# Patient Record
Sex: FEMALE | Race: WHITE | NOT HISPANIC OR LATINO
[De-identification: names, ages, dates, MRNs, and addresses within clinical notes are randomized per-mention and may not be internally consistent; named-entity substitution may affect disease eponyms.]

---

## 2021-07-21 ENCOUNTER — APPOINTMENT (OUTPATIENT)
Dept: OPHTHALMOLOGY | Facility: CLINIC | Age: 86
End: 2021-07-21

## 2021-07-28 ENCOUNTER — APPOINTMENT (OUTPATIENT)
Dept: OPHTHALMOLOGY | Facility: CLINIC | Age: 86
End: 2021-07-28

## 2021-08-04 ENCOUNTER — NON-APPOINTMENT (OUTPATIENT)
Age: 86
End: 2021-08-04

## 2021-08-04 ENCOUNTER — APPOINTMENT (OUTPATIENT)
Dept: OPHTHALMOLOGY | Facility: CLINIC | Age: 86
End: 2021-08-04
Payer: MEDICARE

## 2021-08-04 PROCEDURE — 99213 OFFICE O/P EST LOW 20 MIN: CPT

## 2021-11-10 ENCOUNTER — APPOINTMENT (OUTPATIENT)
Dept: OPHTHALMOLOGY | Facility: CLINIC | Age: 86
End: 2021-11-10
Payer: MEDICARE

## 2021-11-10 ENCOUNTER — NON-APPOINTMENT (OUTPATIENT)
Age: 86
End: 2021-11-10

## 2021-11-10 PROCEDURE — 92133 CPTRZD OPH DX IMG PST SGM ON: CPT

## 2021-11-10 PROCEDURE — 92014 COMPRE OPH EXAM EST PT 1/>: CPT

## 2022-01-19 ENCOUNTER — APPOINTMENT (OUTPATIENT)
Dept: OPHTHALMOLOGY | Facility: CLINIC | Age: 87
End: 2022-01-19

## 2022-01-26 ENCOUNTER — APPOINTMENT (OUTPATIENT)
Dept: OPHTHALMOLOGY | Facility: CLINIC | Age: 87
End: 2022-01-26
Payer: MEDICARE

## 2022-01-26 ENCOUNTER — NON-APPOINTMENT (OUTPATIENT)
Age: 87
End: 2022-01-26

## 2022-01-26 PROCEDURE — 92083 EXTENDED VISUAL FIELD XM: CPT

## 2022-02-16 ENCOUNTER — APPOINTMENT (OUTPATIENT)
Dept: OPHTHALMOLOGY | Facility: CLINIC | Age: 87
End: 2022-02-16
Payer: MEDICARE

## 2022-02-16 ENCOUNTER — NON-APPOINTMENT (OUTPATIENT)
Age: 87
End: 2022-02-16

## 2022-02-16 PROCEDURE — 92134 CPTRZ OPH DX IMG PST SGM RTA: CPT

## 2022-02-16 PROCEDURE — 92014 COMPRE OPH EXAM EST PT 1/>: CPT

## 2022-08-24 ENCOUNTER — APPOINTMENT (OUTPATIENT)
Dept: OPHTHALMOLOGY | Facility: CLINIC | Age: 87
End: 2022-08-24

## 2022-08-24 ENCOUNTER — NON-APPOINTMENT (OUTPATIENT)
Age: 87
End: 2022-08-24

## 2022-08-24 PROCEDURE — 92014 COMPRE OPH EXAM EST PT 1/>: CPT

## 2022-08-24 PROCEDURE — 92250 FUNDUS PHOTOGRAPHY W/I&R: CPT

## 2022-12-14 ENCOUNTER — NON-APPOINTMENT (OUTPATIENT)
Age: 87
End: 2022-12-14

## 2022-12-14 ENCOUNTER — APPOINTMENT (OUTPATIENT)
Dept: OPHTHALMOLOGY | Facility: CLINIC | Age: 87
End: 2022-12-14

## 2022-12-14 PROCEDURE — 92133 CPTRZD OPH DX IMG PST SGM ON: CPT

## 2022-12-14 PROCEDURE — 92083 EXTENDED VISUAL FIELD XM: CPT

## 2022-12-21 ENCOUNTER — NON-APPOINTMENT (OUTPATIENT)
Age: 87
End: 2022-12-21

## 2022-12-21 ENCOUNTER — APPOINTMENT (OUTPATIENT)
Dept: OPHTHALMOLOGY | Facility: CLINIC | Age: 87
End: 2022-12-21

## 2022-12-21 PROCEDURE — 65205 REMOVE FOREIGN BODY FROM EYE: CPT | Mod: LT

## 2022-12-21 PROCEDURE — 99213 OFFICE O/P EST LOW 20 MIN: CPT | Mod: 25

## 2023-04-19 ENCOUNTER — NON-APPOINTMENT (OUTPATIENT)
Age: 88
End: 2023-04-19

## 2023-04-19 ENCOUNTER — APPOINTMENT (OUTPATIENT)
Dept: OPHTHALMOLOGY | Facility: CLINIC | Age: 88
End: 2023-04-19
Payer: MEDICARE

## 2023-04-19 PROCEDURE — 65210 REMOVE FOREIGN BODY FROM EYE: CPT | Mod: LT

## 2023-04-19 PROCEDURE — 99213 OFFICE O/P EST LOW 20 MIN: CPT | Mod: 25

## 2023-07-19 ENCOUNTER — APPOINTMENT (OUTPATIENT)
Dept: OPHTHALMOLOGY | Facility: CLINIC | Age: 88
End: 2023-07-19

## 2023-07-26 ENCOUNTER — INPATIENT (INPATIENT)
Facility: HOSPITAL | Age: 88
LOS: 6 days | Discharge: NURSING, REHAB OR LONG-TERM CARE | DRG: 812 | End: 2023-08-02
Attending: HOSPITALIST | Admitting: HOSPITALIST
Payer: MEDICARE

## 2023-07-26 VITALS
HEIGHT: 64 IN | SYSTOLIC BLOOD PRESSURE: 101 MMHG | TEMPERATURE: 98 F | DIASTOLIC BLOOD PRESSURE: 44 MMHG | HEART RATE: 63 BPM | RESPIRATION RATE: 24 BRPM | OXYGEN SATURATION: 100 % | WEIGHT: 136.91 LBS

## 2023-07-26 DIAGNOSIS — E87.1 HYPO-OSMOLALITY AND HYPONATREMIA: ICD-10-CM

## 2023-07-26 LAB
ABO RH CONFIRMATION: SIGNIFICANT CHANGE UP
ADD ON TEST-SPECIMEN IN LAB: SIGNIFICANT CHANGE UP
ALBUMIN SERPL ELPH-MCNC: 2.4 G/DL — LOW (ref 3.3–5)
ALP SERPL-CCNC: 92 U/L — SIGNIFICANT CHANGE UP (ref 40–120)
ALT FLD-CCNC: 10 U/L — LOW (ref 12–78)
ANION GAP SERPL CALC-SCNC: 6 MMOL/L — SIGNIFICANT CHANGE UP (ref 5–17)
ANION GAP SERPL CALC-SCNC: 7 MMOL/L — SIGNIFICANT CHANGE UP (ref 5–17)
APPEARANCE UR: CLEAR — SIGNIFICANT CHANGE UP
APTT BLD: 30.4 SEC — SIGNIFICANT CHANGE UP (ref 24.5–35.6)
AST SERPL-CCNC: 13 U/L — LOW (ref 15–37)
BACTERIA # UR AUTO: ABNORMAL
BASOPHILS # BLD AUTO: 0.09 K/UL — SIGNIFICANT CHANGE UP (ref 0–0.2)
BASOPHILS NFR BLD AUTO: 0.7 % — SIGNIFICANT CHANGE UP (ref 0–2)
BILIRUB SERPL-MCNC: 0.3 MG/DL — SIGNIFICANT CHANGE UP (ref 0.2–1.2)
BILIRUB UR-MCNC: NEGATIVE — SIGNIFICANT CHANGE UP
BLD GP AB SCN SERPL QL: SIGNIFICANT CHANGE UP
BUN SERPL-MCNC: 20 MG/DL — SIGNIFICANT CHANGE UP (ref 7–23)
BUN SERPL-MCNC: 23 MG/DL — SIGNIFICANT CHANGE UP (ref 7–23)
CALCIUM SERPL-MCNC: 7.7 MG/DL — LOW (ref 8.5–10.1)
CALCIUM SERPL-MCNC: 8.2 MG/DL — LOW (ref 8.5–10.1)
CHLORIDE SERPL-SCNC: 88 MMOL/L — LOW (ref 96–108)
CHLORIDE SERPL-SCNC: 90 MMOL/L — LOW (ref 96–108)
CO2 SERPL-SCNC: 24 MMOL/L — SIGNIFICANT CHANGE UP (ref 22–31)
CO2 SERPL-SCNC: 25 MMOL/L — SIGNIFICANT CHANGE UP (ref 22–31)
COLOR SPEC: YELLOW — SIGNIFICANT CHANGE UP
COMMENT - URINE: SIGNIFICANT CHANGE UP
CREAT SERPL-MCNC: 1.05 MG/DL — SIGNIFICANT CHANGE UP (ref 0.5–1.3)
CREAT SERPL-MCNC: 1.15 MG/DL — SIGNIFICANT CHANGE UP (ref 0.5–1.3)
DIFF PNL FLD: NEGATIVE — SIGNIFICANT CHANGE UP
EGFR: 46 ML/MIN/1.73M2 — LOW
EGFR: 51 ML/MIN/1.73M2 — LOW
EOSINOPHIL # BLD AUTO: 0.12 K/UL — SIGNIFICANT CHANGE UP (ref 0–0.5)
EOSINOPHIL NFR BLD AUTO: 0.9 % — SIGNIFICANT CHANGE UP (ref 0–6)
EPI CELLS # UR: ABNORMAL
GLUCOSE SERPL-MCNC: 100 MG/DL — HIGH (ref 70–99)
GLUCOSE SERPL-MCNC: 96 MG/DL — SIGNIFICANT CHANGE UP (ref 70–99)
GLUCOSE UR QL: NEGATIVE — SIGNIFICANT CHANGE UP
HCT VFR BLD CALC: 22.5 % — LOW (ref 34.5–45)
HGB BLD-MCNC: 8 G/DL — LOW (ref 11.5–15.5)
IMM GRANULOCYTES NFR BLD AUTO: 0.8 % — SIGNIFICANT CHANGE UP (ref 0–0.9)
INR BLD: 1 RATIO — SIGNIFICANT CHANGE UP (ref 0.85–1.18)
KETONES UR-MCNC: NEGATIVE — SIGNIFICANT CHANGE UP
LEUKOCYTE ESTERASE UR-ACNC: ABNORMAL
LYMPHOCYTES # BLD AUTO: 15.6 % — SIGNIFICANT CHANGE UP (ref 13–44)
LYMPHOCYTES # BLD AUTO: 2.15 K/UL — SIGNIFICANT CHANGE UP (ref 1–3.3)
MAGNESIUM SERPL-MCNC: 2.3 MG/DL — SIGNIFICANT CHANGE UP (ref 1.6–2.6)
MAGNESIUM SERPL-MCNC: 2.3 MG/DL — SIGNIFICANT CHANGE UP (ref 1.6–2.6)
MCHC RBC-ENTMCNC: 31.5 PG — SIGNIFICANT CHANGE UP (ref 27–34)
MCHC RBC-ENTMCNC: 35.6 GM/DL — SIGNIFICANT CHANGE UP (ref 32–36)
MCV RBC AUTO: 88.6 FL — SIGNIFICANT CHANGE UP (ref 80–100)
MONOCYTES # BLD AUTO: 1.06 K/UL — HIGH (ref 0–0.9)
MONOCYTES NFR BLD AUTO: 7.7 % — SIGNIFICANT CHANGE UP (ref 2–14)
NEUTROPHILS # BLD AUTO: 10.26 K/UL — HIGH (ref 1.8–7.4)
NEUTROPHILS NFR BLD AUTO: 74.3 % — SIGNIFICANT CHANGE UP (ref 43–77)
NITRITE UR-MCNC: NEGATIVE — SIGNIFICANT CHANGE UP
NT-PROBNP SERPL-SCNC: 8936 PG/ML — HIGH (ref 0–450)
PH UR: 6 — SIGNIFICANT CHANGE UP (ref 5–8)
PHOSPHATE SERPL-MCNC: 2.7 MG/DL — SIGNIFICANT CHANGE UP (ref 2.5–4.5)
PHOSPHATE SERPL-MCNC: 3.1 MG/DL — SIGNIFICANT CHANGE UP (ref 2.5–4.5)
PLATELET # BLD AUTO: 381 K/UL — SIGNIFICANT CHANGE UP (ref 150–400)
POTASSIUM SERPL-MCNC: 4.2 MMOL/L — SIGNIFICANT CHANGE UP (ref 3.5–5.3)
POTASSIUM SERPL-MCNC: 4.3 MMOL/L — SIGNIFICANT CHANGE UP (ref 3.5–5.3)
POTASSIUM SERPL-SCNC: 4.2 MMOL/L — SIGNIFICANT CHANGE UP (ref 3.5–5.3)
POTASSIUM SERPL-SCNC: 4.3 MMOL/L — SIGNIFICANT CHANGE UP (ref 3.5–5.3)
PROT SERPL-MCNC: 6.2 GM/DL — SIGNIFICANT CHANGE UP (ref 6–8.3)
PROT UR-MCNC: SIGNIFICANT CHANGE UP MG/DL
PROTHROM AB SERPL-ACNC: 11.3 SEC — SIGNIFICANT CHANGE UP (ref 9.5–13)
RBC # BLD: 2.54 M/UL — LOW (ref 3.8–5.2)
RBC # FLD: 12.6 % — SIGNIFICANT CHANGE UP (ref 10.3–14.5)
RBC CASTS # UR COMP ASSIST: NEGATIVE /HPF — SIGNIFICANT CHANGE UP (ref 0–4)
SODIUM SERPL-SCNC: 119 MMOL/L — CRITICAL LOW (ref 135–145)
SODIUM SERPL-SCNC: 121 MMOL/L — LOW (ref 135–145)
SP GR SPEC: 1.01 — SIGNIFICANT CHANGE UP (ref 1.01–1.02)
TROPONIN I, HIGH SENSITIVITY RESULT: 12.01 NG/L — SIGNIFICANT CHANGE UP
TSH SERPL-MCNC: 4.22 UU/ML — SIGNIFICANT CHANGE UP (ref 0.34–4.82)
UROBILINOGEN FLD QL: NEGATIVE — SIGNIFICANT CHANGE UP
WBC # BLD: 13.79 K/UL — HIGH (ref 3.8–10.5)
WBC # FLD AUTO: 13.79 K/UL — HIGH (ref 3.8–10.5)
WBC UR QL: SIGNIFICANT CHANGE UP /HPF (ref 0–5)

## 2023-07-26 PROCEDURE — 83930 ASSAY OF BLOOD OSMOLALITY: CPT

## 2023-07-26 PROCEDURE — 71045 X-RAY EXAM CHEST 1 VIEW: CPT | Mod: 26

## 2023-07-26 PROCEDURE — G1004: CPT

## 2023-07-26 PROCEDURE — 71046 X-RAY EXAM CHEST 2 VIEWS: CPT

## 2023-07-26 PROCEDURE — 83880 ASSAY OF NATRIURETIC PEPTIDE: CPT

## 2023-07-26 PROCEDURE — 84550 ASSAY OF BLOOD/URIC ACID: CPT

## 2023-07-26 PROCEDURE — 84300 ASSAY OF URINE SODIUM: CPT

## 2023-07-26 PROCEDURE — 85027 COMPLETE CBC AUTOMATED: CPT

## 2023-07-26 PROCEDURE — 83935 ASSAY OF URINE OSMOLALITY: CPT

## 2023-07-26 PROCEDURE — 83550 IRON BINDING TEST: CPT

## 2023-07-26 PROCEDURE — 81001 URINALYSIS AUTO W/SCOPE: CPT

## 2023-07-26 PROCEDURE — P9016: CPT

## 2023-07-26 PROCEDURE — 70450 CT HEAD/BRAIN W/O DYE: CPT | Mod: MG

## 2023-07-26 PROCEDURE — 99291 CRITICAL CARE FIRST HOUR: CPT

## 2023-07-26 PROCEDURE — 82272 OCCULT BLD FECES 1-3 TESTS: CPT

## 2023-07-26 PROCEDURE — 82728 ASSAY OF FERRITIN: CPT

## 2023-07-26 PROCEDURE — 80048 BASIC METABOLIC PNL TOTAL CA: CPT

## 2023-07-26 PROCEDURE — 93010 ELECTROCARDIOGRAM REPORT: CPT

## 2023-07-26 PROCEDURE — 83540 ASSAY OF IRON: CPT

## 2023-07-26 PROCEDURE — 80162 ASSAY OF DIGOXIN TOTAL: CPT

## 2023-07-26 PROCEDURE — 97163 PT EVAL HIGH COMPLEX 45 MIN: CPT | Mod: GP

## 2023-07-26 PROCEDURE — 97116 GAIT TRAINING THERAPY: CPT | Mod: GP

## 2023-07-26 PROCEDURE — 86920 COMPATIBILITY TEST SPIN: CPT

## 2023-07-26 PROCEDURE — 36430 TRANSFUSION BLD/BLD COMPNT: CPT

## 2023-07-26 PROCEDURE — 36415 COLL VENOUS BLD VENIPUNCTURE: CPT

## 2023-07-26 RX ORDER — ESCITALOPRAM OXALATE 10 MG/1
2 TABLET, FILM COATED ORAL
Refills: 0 | DISCHARGE

## 2023-07-26 RX ORDER — ACETAMINOPHEN 500 MG
2 TABLET ORAL
Refills: 0 | DISCHARGE

## 2023-07-26 RX ORDER — RANOLAZINE 500 MG/1
1 TABLET, FILM COATED, EXTENDED RELEASE ORAL
Refills: 0 | DISCHARGE

## 2023-07-26 RX ORDER — SODIUM ZIRCONIUM CYCLOSILICATE 10 G/10G
10 POWDER, FOR SUSPENSION ORAL
Refills: 0 | DISCHARGE

## 2023-07-26 RX ORDER — SODIUM CHLORIDE 9 MG/ML
1 INJECTION INTRAMUSCULAR; INTRAVENOUS; SUBCUTANEOUS
Refills: 0 | DISCHARGE
Start: 2023-07-26 | End: 2023-07-29

## 2023-07-26 RX ORDER — ASPIRIN/CALCIUM CARB/MAGNESIUM 324 MG
1 TABLET ORAL
Refills: 0 | DISCHARGE

## 2023-07-26 RX ORDER — ATORVASTATIN CALCIUM 80 MG/1
1 TABLET, FILM COATED ORAL
Refills: 0 | DISCHARGE

## 2023-07-26 RX ORDER — LEVOTHYROXINE SODIUM 125 MCG
1 TABLET ORAL
Refills: 0 | DISCHARGE

## 2023-07-26 RX ORDER — DOCUSATE SODIUM 100 MG
2 CAPSULE ORAL
Refills: 0 | DISCHARGE

## 2023-07-26 RX ORDER — POLYETHYLENE GLYCOL 3350 17 G/17G
17 POWDER, FOR SOLUTION ORAL
Refills: 0 | DISCHARGE

## 2023-07-26 RX ORDER — METOPROLOL TARTRATE 50 MG
1 TABLET ORAL
Refills: 0 | DISCHARGE

## 2023-07-26 RX ORDER — CLOPIDOGREL BISULFATE 75 MG/1
1 TABLET, FILM COATED ORAL
Refills: 0 | DISCHARGE

## 2023-07-26 RX ORDER — LATANOPROST 0.05 MG/ML
1 SOLUTION/ DROPS OPHTHALMIC; TOPICAL
Refills: 0 | DISCHARGE

## 2023-07-26 RX ORDER — MAGNESIUM HYDROXIDE 400 MG/1
30 TABLET, CHEWABLE ORAL
Refills: 0 | DISCHARGE

## 2023-07-26 NOTE — ED PROVIDER NOTE - DIFFERENTIAL DIAGNOSIS
Including but not limited to: anemia, electrolyte disturbance, dehydration, UTI, other Differential Diagnosis

## 2023-07-26 NOTE — CONSULT NOTE ADULT - SUBJECTIVE AND OBJECTIVE BOX
Patient is a 89y old  Female who presents with a chief complaint of     BRIEF HOSPITAL COURSE-  88 y/o Female with PMH of Afib (s/p PPM), TAVR (On ASA and Plavix), HTN, HLD, GERD, Anxiety, Former Smoker presents to  ED BIBA for abnormal lab value. Reports she was sent in from Rehab for low hemoglobin ( 7.3 to 6.7). States she has been increasingly tired this past week. Admits to associated dizziness and intermittent headaches.      Collateral from Daughter. States she had a mechanical fall with LOC at home in beginning of July. Sent to ED for evaluation, found to have grade 2 ruptured spleen and cardiac arrhthymias with sinus pauses prompting POM placement. Discharged to rehab for extensive PT.         Review of Systems:  CONSTITUTIONAL: +Fatigue. No fever or chills.  EYES: No eye pain, visual disturbances, or discharge.  ENMT:  No difficulty hearing, tinnitus, vertigo. No sinus or throat pain.  NECK: No pain or stiffness.  RESPIRATORY: No cough, wheezing, chills or hemoptysis. No shortness of breath.  CARDIOVASCULAR: No chest pain, palpitations, dizziness, or leg swelling.  GASTROINTESTINAL: No abdominal or epigastric pain. No nausea, vomiting, or hematemesis. No diarrhea or constipation. No melena or hematochezia.  GENITOURINARY: No dysuria, frequency, hematuria, or incontinence.  NEUROLOGICAL: No headaches, memory loss, loss of strength, numbness, or tremors.  SKIN: No itching, burning, rashes, or lesions.   MUSCULOSKELETAL: No joint pain or swelling. No muscle, back, or extremity pain.  PSYCHIATRIC: No depression, anxiety, mood swings, or difficulty sleeping.        PAST MEDICAL & SURGICAL HISTORY-        Medications:          ICU Vital Signs Last 24 Hrs  T(C): 36.7 (2023 21:14), Max: 36.7 (2023 21:14)  T(F): 98.1 (2023 21:14), Max: 98.1 (2023 21:14)  HR: 84 (2023 21:14) (63 - 84)  BP: 118/61 (2023 21:14) (101/44 - 118/61)  BP(mean): --  ABP: --  ABP(mean): --  RR: 18 (2023 21:14) (18 - 24)  SpO2: 97% (2023 21:14) (97% - 100%)    O2 Parameters below as of 2023 21:14  Patient On (Oxygen Delivery Method): room air        I&O's Detail            LABS:                        8.0    13.79 )-----------( 381      ( 2023 16:53 )             22.5     07-    121<L>  |  90<L>  |  20  ----------------------------<  96  4.2   |  24  |  1.05    Ca    7.7<L>      2023 20:36  Phos  2.7       Mg     2.3         TPro  6.2  /  Alb  2.4<L>  /  TBili  0.3  /  DBili  x   /  AST  13<L>  /  ALT  10<L>  /  AlkPhos  92            CAPILLARY BLOOD GLUCOSE        PT/INR - ( 2023 17:23 )   PT: 11.3 sec;   INR: 1.00 ratio    PTT - ( 2023 17:23 )  PTT:30.4 sec        Urinalysis Basic - ( 2023 20:36 )  Color: Yellow / Appearance: Clear / S.015 / pH: x  Gluc: 96 mg/dL / Ketone: Negative  / Bili: Negative / Urobili: Negative   Blood: x / Protein: Trace mg/dL / Nitrite: Negative   Leuk Esterase: Trace / RBC: Negative /HPF / WBC 0-2 /HPF   Sq Epi: x / Non Sq Epi: x / Bacteria: Occasional        CULTURES:        Physical Examination:  General: Elderly female, well developed. In no acute distress.    HEENT: Pupils equal, reactive to light. Symmetric.  PULM: Clear to auscultation bilaterally, no adventitious sounds. No significant sputum production.  NECK: Supple, no lymphadenopathy, trachea midline.  CVS: Regular rate, paced rhythm. No murmurs, rubs, or gallops. S1, S2 intact.   ABD: Soft, nondistended, nontender, normoactive bowel sounds. No masses palpable.   EXT: No edema, nontender.  SKIN: Warm and well perfused, no rashes noted.  NEURO: Alert, oriented, interactive, nonfocal.  DEVICES:         RADIOLOGY-    < from: Xray Chest 1 View- PORTABLE-Routine (Xray Chest 1 View- PORTABLE-Routine .) (23 @ 18:00) >  ACC: [id number] EXAM:  XR CHEST PORTABLE ROUTINE 1V   ORDERED BY: MATIAS SHARPE     PROCEDURE DATE:  2023      INTERPRETATION:  Portable chest radiograph    CLINICAL INFORMATION: Generalized weakness    TECHNIQUE:  Portable  AP chest radiograph.    COMPARISON: 2023 Chest X Ray .    FINDINGS:  CATHETERS AND TUBES: None    PULMONARY: The visualized lungs are clear of airspace consolidations or   pleural effusions.   No pneumothorax.    HEART/VASCULAR: The heart is mildly enlarged intransverse diameter.   Status post cardiac valve replacement.  Cardiac device wire lead is within right ventricle. .    BONES: Visualized osseous thorax intact.    IMPRESSION:   No radiographic evidence of active chest disease.    --- End of Report ---      LEDA GUPTA MD; Attending Radiologist  This document has been electronically signed. 2023  6:24PM    < end of copied text >        Time spent on this patient encounter, which includes documenting this note in the electronic medical record, was 75+ minutes including assessing the presenting problems with associated risks, reviewing the medical record to prepare for the encounter, and meeting face to face with the patient to obtain additional history.  I have also performed an appropriate physical exam, made interventions listed and ordered and interpreted appropriate diagnostic studies as documented.     To improve communication and patient safety, I have coordinated care with the multidisciplinary team including the bedside nurse, appropriate attending of record and consultants as needed.       Patient is a 89y old  Female who presents with a chief complaint of     BRIEF HOSPITAL COURSE-  89 y/o Female with PMH of Afib (s/p PPM), TAVR (On ASA and Plavix), HTN, HLD, GERD, Anxiety, Former Smoker presents to  ED BIBA for abnormal lab value. Reports she was sent in from Rehab for low hemoglobin ( 7.3 to 6.7). States she has been increasingly tired this past week. Admits to associated dizziness and intermittent headaches.      Collateral from Daughter. States she had a mechanical fall with LOC at home in beginning of July. Sent to ED for evaluation, found to have grade 2 ruptured spleen and cardiac arrhthymias with sinus pauses prompting POM placement. Discharged to rehab for extensive PT.         Review of Systems:  CONSTITUTIONAL: +Fatigue. No fever or chills.  EYES: No eye pain, visual disturbances, or discharge.  ENMT:  No difficulty hearing, tinnitus, vertigo. No sinus or throat pain.  NECK: No pain or stiffness.  RESPIRATORY: No cough, wheezing, chills or hemoptysis. No shortness of breath.  CARDIOVASCULAR: No chest pain, palpitations, dizziness, or leg swelling.  GASTROINTESTINAL: No abdominal or epigastric pain. No nausea, vomiting, or hematemesis. No diarrhea or constipation. No melena or hematochezia.  GENITOURINARY: No dysuria, frequency, hematuria, or incontinence.  NEUROLOGICAL: No headaches, memory loss, loss of strength, numbness, or tremors.  SKIN: No itching, burning, rashes, or lesions.   MUSCULOSKELETAL: No joint pain or swelling. No muscle, back, or extremity pain.  PSYCHIATRIC: No depression, anxiety, mood swings, or difficulty sleeping.        PAST MEDICAL & SURGICAL HISTORY-        Medications:        ICU Vital Signs Last 24 Hrs  T(C): 36.7 (2023 21:14), Max: 36.7 (2023 21:14)  T(F): 98.1 (2023 21:14), Max: 98.1 (2023 21:14)  HR: 84 (2023 21:14) (63 - 84)  BP: 118/61 (2023 21:14) (101/44 - 118/61)  BP(mean): --  ABP: --  ABP(mean): --  RR: 18 (2023 21:14) (18 - 24)  SpO2: 97% (2023 21:14) (97% - 100%)    O2 Parameters below as of 2023 21:14  Patient On (Oxygen Delivery Method): room air        I&O's Detail            LABS:                        8.0    13.79 )-----------( 381      ( 2023 16:53 )             22.5     07-    121<L>  |  90<L>  |  20  ----------------------------<  96  4.2   |  24  |  1.05    Ca    7.7<L>      2023 20:36  Phos  2.7       Mg     2.3         TPro  6.2  /  Alb  2.4<L>  /  TBili  0.3  /  DBili  x   /  AST  13<L>  /  ALT  10<L>  /  AlkPhos  92            CAPILLARY BLOOD GLUCOSE        PT/INR - ( 2023 17:23 )   PT: 11.3 sec;   INR: 1.00 ratio    PTT - ( 2023 17:23 )  PTT:30.4 sec        Urinalysis Basic - ( 2023 20:36 )  Color: Yellow / Appearance: Clear / S.015 / pH: x  Gluc: 96 mg/dL / Ketone: Negative  / Bili: Negative / Urobili: Negative   Blood: x / Protein: Trace mg/dL / Nitrite: Negative   Leuk Esterase: Trace / RBC: Negative /HPF / WBC 0-2 /HPF   Sq Epi: x / Non Sq Epi: x / Bacteria: Occasional        CULTURES:        Physical Examination:  General: Elderly female, well developed. In no acute distress.    HEENT: Pupils equal, reactive to light. Symmetric.  PULM: Clear to auscultation bilaterally, no adventitious sounds. No significant sputum production.  NECK: Supple, no lymphadenopathy, trachea midline.  CVS: Regular rate, paced rhythm. No murmurs, rubs, or gallops. S1, S2 intact.   ABD: Soft, nondistended, nontender, normoactive bowel sounds. No masses palpable.   EXT: No edema, nontender.  SKIN: Warm and well perfused, no rashes noted.  NEURO: Alert, oriented, interactive, nonfocal.  DEVICES:         RADIOLOGY-    < from: Xray Chest 1 View- PORTABLE-Routine (Xray Chest 1 View- PORTABLE-Routine .) (23 @ 18:00) >  ACC: [id number] EXAM:  XR CHEST PORTABLE ROUTINE 1V   ORDERED BY: MATIAS SHARPE     PROCEDURE DATE:  2023      INTERPRETATION:  Portable chest radiograph    CLINICAL INFORMATION: Generalized weakness    TECHNIQUE:  Portable  AP chest radiograph.    COMPARISON: 2023 Chest X Ray .    FINDINGS:  CATHETERS AND TUBES: None    PULMONARY: The visualized lungs are clear of airspace consolidations or   pleural effusions.   No pneumothorax.    HEART/VASCULAR: The heart is mildly enlarged intransverse diameter.   Status post cardiac valve replacement.  Cardiac device wire lead is within right ventricle. .    BONES: Visualized osseous thorax intact.    IMPRESSION:   No radiographic evidence of active chest disease.    --- End of Report ---      LEDA GUPTA MD; Attending Radiologist  This document has been electronically signed. 2023  6:24PM    < end of copied text >        Time spent on this patient encounter, which includes documenting this note in the electronic medical record, was 75+ minutes including assessing the presenting problems with associated risks, reviewing the medical record to prepare for the encounter, and meeting face to face with the patient to obtain additional history.  I have also performed an appropriate physical exam, made interventions listed and ordered and interpreted appropriate diagnostic studies as documented.     To improve communication and patient safety, I have coordinated care with the multidisciplinary team including the bedside nurse, appropriate attending of record and consultants as needed.

## 2023-07-26 NOTE — ED ADULT NURSE NOTE - OBJECTIVE STATEMENT
Pt. presented to the ED with daughter c/o low Hgb. Per daughter, pt. was at [facility] for rehab due to PPM placement. Hgb yesterday was 7.3 but today it was 6.8. Was advised by doctor to got to the ED for blood transfusion. Pt. c/o light handedness. Denies any pain, SOB, CP at this time.

## 2023-07-26 NOTE — ED PROVIDER NOTE - OBJECTIVE STATEMENT
Pt is a 89y female with a PMH of Afib on Plavix and baby aspirin s/p pacemaker/heart valve, HTN, HLD, GERD, anxiety, former smoker, hx of heart failure presents to the ED [facility] s/p abnormal labs of low Hgb (went from 7.3 to 6.7 today.) Pt reports generalized weakness and abdominal discomfort for the past week. Has a history of low Hgb, sees Cardiologist. Daughter at bedside reports a mechanical fall on 7/2 w/ LOC w/ a grade 2 ruptured spleen and pacemaker 7/5, has been in rehab since. Denies acute SOB, dysuria, hematuria, bloody stools.

## 2023-07-26 NOTE — ED ADULT NURSE REASSESSMENT NOTE - NS ED NURSE REASSESS COMMENT FT1
Received patient in ER bed #8 accompanied by family. Patient a & o x4, respirations even and unlabored on room air. Await dispo.

## 2023-07-26 NOTE — ED PROVIDER NOTE - PROGRESS NOTE DETAILS
Genoveva Barbour:  Lot [id number]  Exp: 10/31/23  Quality check passed  Negative Genoveva Barbour: Spoke to ICU PA patient w/ Na of 119. Patient seen by ICU team and deferred for medical admission. Will admit to medicine team. Pt endorsed to hospitalist.

## 2023-07-26 NOTE — ED PROVIDER NOTE - PHYSICAL EXAMINATION
GENERAL: A&Ox4, non-toxic appearing, no acute distress  HEENT: NCAT, EOMI, oral mucosa moist, normal conjunctiva  RESP: CTAB, no respiratory distress, no wheezes/rhonchi/rales, speaking in full sentences  CV: RRR, no murmurs/rubs/gallops  ABDOMEN: soft, non-tender, non-distended, no guarding, no CVA tenderness  MSK: no visible deformities  NEURO: no focal sensory or motor deficits, CN 2-12 grossly intact  SKIN: warm, normal color, well perfused, no rash  PSYCH: normal affect       *TO DO* GENERAL: A&Ox4, non-toxic appearing, no acute distress  HEENT: NCAT, EOMI, oral mucosa moist, normal conjunctiva  RESP: CTAB, no respiratory distress, no wheezes/rhonchi/rales, speaking in full sentences  CV: Irregularly irregular, no murmurs/rubs/gallops  ABDOMEN: soft, non-tender, non-distended, no guarding, no CVA tenderness  MSK: no visible deformities  NEURO: no focal sensory or motor deficits, CN 2-12 grossly intact  SKIN: warm, normal color, well perfused, no rash  PSYCH: normal affect

## 2023-07-26 NOTE — ED PROVIDER NOTE - CLINICAL SUMMARY MEDICAL DECISION MAKING FREE TEXT BOX
89-year-old female presents for generalized weakness over the past few days.  She has been getting followed at her nursing facility for anemia, was reportedly found to have a hemoglobin of 6.7 today. DDx as above. Plan for ekg, labs, cxr, ua, reassess for admit.

## 2023-07-26 NOTE — PHARMACOTHERAPY INTERVENTION NOTE - COMMENTS
Medication reconciliation completed.  Reviewed Medication list and confirmed med allergies with list from Care Facility "[facility]"; confirmed with Dr. First Medmiah.

## 2023-07-26 NOTE — CONSULT NOTE ADULT - ASSESSMENT
89 y/o Female with PMH of Afib (s/p PPM), TAVR (On ASA and Plavix), HTN, HLD, GERD, Anxiety, Former Smoker presents to  ED BIBA for abnormal lab value with:       1. Hyponatremia       -Hyponatremia, Na 119 on admission. Incidental finding. Unclear etiology at this time, likely multifactorial in setting of medications v decreased PO intake.   -Repeat BMP ordered. Patient did not receive IV fluids in ED. Expect appropriate rise in Na. Would defer aggressive IV fluid resuscitation.   -Endorsing recent fatigue, intermittent headaches. However given recent anemia unclear whether symptoms are secondary to hyponatremia or anemia. However patient with multiple falls recently. Recommend CT Head to r/o intracranial pathology.   -Recommend starting DASH diet, fluid restriction <1L per day. Urine Na/Osmolality and Serum Osmolality ordered. Repeat labs Q6-8hrs, Na >120. Goal 4-6meq correction in 24hrs. Avoid overcorrection. Nephrology consult in AM.         Patient does not require ICU level care at this time. Please re-consult if patient status changes. Recommend admission to Medicine. Plan discussed with eICU Attending and ED Attending.

## 2023-07-26 NOTE — ED ADULT NURSE NOTE - NSFALLHARMRISKINTERV_ED_ALL_ED
Assistance OOB with selected safe patient handling equipment if applicable/Assistance with ambulation/Communicate risk of Fall with Harm to all staff, patient, and family/Monitor gait and stability/Provide visual cue: red socks, yellow wristband, yellow gown, etc/Reinforce activity limits and safety measures with patient and family/Bed in lowest position, wheels locked, appropriate side rails in place/Call bell, personal items and telephone in reach/Instruct patient to call for assistance before getting out of bed/chair/stretcher/Non-slip footwear applied when patient is off stretcher/[place] to call system/Physically safe environment - no spills, clutter or unnecessary equipment/Purposeful Proactive Rounding/Room/bathroom lighting operational, light cord in reach

## 2023-07-26 NOTE — ED PROVIDER NOTE - NS ED ROS FT
GENERAL: no fever, no chills, no weight loss  EYES: no change in vision, no irritation, no discharge, no redness, no pain  HEENT: no trouble swallowing or speaking, no throat pain, no ear pain  CARDIAC: no chest pain, no palpitations   PULMONARY: no cough, no shortness of breath, no wheezing  GI: no abdominal pain, no nausea, no vomiting, no diarrhea, no constipation, no melena, no hematochezia, no hematemesis  : no changes in urination, no dysuria, no frequency, no hematuria, no discharge  SKIN: no rashes  NEURO: no headache, no numbness, no weakness  MSK: no joint pain, no muscle pain, no back pain, no calf pain       *TO DO*

## 2023-07-27 PROBLEM — Z00.00 ENCOUNTER FOR PREVENTIVE HEALTH EXAMINATION: Status: ACTIVE | Noted: 2023-07-27

## 2023-07-27 LAB
ANION GAP SERPL CALC-SCNC: 6 MMOL/L — SIGNIFICANT CHANGE UP (ref 5–17)
ANION GAP SERPL CALC-SCNC: 7 MMOL/L — SIGNIFICANT CHANGE UP (ref 5–17)
BUN SERPL-MCNC: 15 MG/DL — SIGNIFICANT CHANGE UP (ref 7–23)
BUN SERPL-MCNC: 16 MG/DL — SIGNIFICANT CHANGE UP (ref 7–23)
CALCIUM SERPL-MCNC: 8.1 MG/DL — LOW (ref 8.5–10.1)
CALCIUM SERPL-MCNC: 8.3 MG/DL — LOW (ref 8.5–10.1)
CHLORIDE SERPL-SCNC: 90 MMOL/L — LOW (ref 96–108)
CHLORIDE SERPL-SCNC: 90 MMOL/L — LOW (ref 96–108)
CO2 SERPL-SCNC: 25 MMOL/L — SIGNIFICANT CHANGE UP (ref 22–31)
CO2 SERPL-SCNC: 27 MMOL/L — SIGNIFICANT CHANGE UP (ref 22–31)
CREAT ?TM UR-MCNC: 56 MG/DL — SIGNIFICANT CHANGE UP
CREAT SERPL-MCNC: 1.08 MG/DL — SIGNIFICANT CHANGE UP (ref 0.5–1.3)
CREAT SERPL-MCNC: 1.14 MG/DL — SIGNIFICANT CHANGE UP (ref 0.5–1.3)
EGFR: 46 ML/MIN/1.73M2 — LOW
EGFR: 49 ML/MIN/1.73M2 — LOW
FERRITIN SERPL-MCNC: 409 NG/ML — HIGH (ref 13–330)
GLUCOSE SERPL-MCNC: 109 MG/DL — HIGH (ref 70–99)
GLUCOSE SERPL-MCNC: 112 MG/DL — HIGH (ref 70–99)
IRON SATN MFR SERPL: 17 UG/DL — LOW (ref 30–160)
IRON SATN MFR SERPL: 19 UG/DL — LOW (ref 30–160)
IRON SATN MFR SERPL: 6 % — LOW (ref 14–50)
IRON SATN MFR SERPL: 9 % — LOW (ref 14–50)
OB PNL STL: NEGATIVE — SIGNIFICANT CHANGE UP
OSMOLALITY SERPL: 249 MOSMOL/KG — LOW (ref 280–301)
OSMOLALITY UR: 305 MOSM/KG — SIGNIFICANT CHANGE UP (ref 50–1200)
POTASSIUM SERPL-MCNC: 4.1 MMOL/L — SIGNIFICANT CHANGE UP (ref 3.5–5.3)
POTASSIUM SERPL-MCNC: 4.6 MMOL/L — SIGNIFICANT CHANGE UP (ref 3.5–5.3)
POTASSIUM SERPL-SCNC: 4.1 MMOL/L — SIGNIFICANT CHANGE UP (ref 3.5–5.3)
POTASSIUM SERPL-SCNC: 4.6 MMOL/L — SIGNIFICANT CHANGE UP (ref 3.5–5.3)
PROCALCITONIN SERPL-MCNC: 0.1 NG/ML — SIGNIFICANT CHANGE UP (ref 0.02–0.1)
SODIUM SERPL-SCNC: 122 MMOL/L — LOW (ref 135–145)
SODIUM SERPL-SCNC: 123 MMOL/L — LOW (ref 135–145)
SODIUM UR-SCNC: <20 MMOL/L — SIGNIFICANT CHANGE UP
TIBC SERPL-MCNC: 222 UG/DL — SIGNIFICANT CHANGE UP (ref 220–430)
TIBC SERPL-MCNC: 265 UG/DL — SIGNIFICANT CHANGE UP (ref 220–430)
UIBC SERPL-MCNC: 202 UG/DL — SIGNIFICANT CHANGE UP (ref 110–370)
UIBC SERPL-MCNC: 249 UG/DL — SIGNIFICANT CHANGE UP (ref 110–370)

## 2023-07-27 PROCEDURE — 99223 1ST HOSP IP/OBS HIGH 75: CPT

## 2023-07-27 PROCEDURE — 99222 1ST HOSP IP/OBS MODERATE 55: CPT

## 2023-07-27 PROCEDURE — 70450 CT HEAD/BRAIN W/O DYE: CPT | Mod: 26

## 2023-07-27 RX ORDER — ALPRAZOLAM 0.25 MG
0.25 TABLET ORAL EVERY 8 HOURS
Refills: 0 | Status: DISCONTINUED | OUTPATIENT
Start: 2023-07-27 | End: 2023-08-02

## 2023-07-27 RX ORDER — DIGOXIN 250 MCG
125 TABLET ORAL DAILY
Refills: 0 | Status: DISCONTINUED | OUTPATIENT
Start: 2023-07-27 | End: 2023-08-02

## 2023-07-27 RX ORDER — ESCITALOPRAM OXALATE 10 MG/1
10 TABLET, FILM COATED ORAL DAILY
Refills: 0 | Status: DISCONTINUED | OUTPATIENT
Start: 2023-07-27 | End: 2023-08-02

## 2023-07-27 RX ORDER — SODIUM CHLORIDE 9 MG/ML
1 INJECTION INTRAMUSCULAR; INTRAVENOUS; SUBCUTANEOUS THREE TIMES A DAY
Refills: 0 | Status: DISCONTINUED | OUTPATIENT
Start: 2023-07-27 | End: 2023-08-02

## 2023-07-27 RX ORDER — SODIUM ZIRCONIUM CYCLOSILICATE 10 G/10G
10 POWDER, FOR SUSPENSION ORAL DAILY
Refills: 0 | Status: DISCONTINUED | OUTPATIENT
Start: 2023-07-27 | End: 2023-07-28

## 2023-07-27 RX ORDER — LATANOPROST 0.05 MG/ML
1 SOLUTION/ DROPS OPHTHALMIC; TOPICAL AT BEDTIME
Refills: 0 | Status: DISCONTINUED | OUTPATIENT
Start: 2023-07-27 | End: 2023-08-02

## 2023-07-27 RX ORDER — LANOLIN ALCOHOL/MO/W.PET/CERES
3 CREAM (GRAM) TOPICAL AT BEDTIME
Refills: 0 | Status: DISCONTINUED | OUTPATIENT
Start: 2023-07-27 | End: 2023-08-02

## 2023-07-27 RX ORDER — ACETAMINOPHEN 500 MG
650 TABLET ORAL EVERY 6 HOURS
Refills: 0 | Status: DISCONTINUED | OUTPATIENT
Start: 2023-07-27 | End: 2023-08-02

## 2023-07-27 RX ORDER — LEVOTHYROXINE SODIUM 125 MCG
75 TABLET ORAL DAILY
Refills: 0 | Status: DISCONTINUED | OUTPATIENT
Start: 2023-07-27 | End: 2023-08-02

## 2023-07-27 RX ORDER — CLOPIDOGREL BISULFATE 75 MG/1
75 TABLET, FILM COATED ORAL DAILY
Refills: 0 | Status: DISCONTINUED | OUTPATIENT
Start: 2023-07-27 | End: 2023-07-30

## 2023-07-27 RX ORDER — ASPIRIN/CALCIUM CARB/MAGNESIUM 324 MG
81 TABLET ORAL DAILY
Refills: 0 | Status: DISCONTINUED | OUTPATIENT
Start: 2023-07-27 | End: 2023-07-30

## 2023-07-27 RX ORDER — METOPROLOL TARTRATE 50 MG
25 TABLET ORAL
Refills: 0 | Status: DISCONTINUED | OUTPATIENT
Start: 2023-07-27 | End: 2023-08-02

## 2023-07-27 RX ORDER — ONDANSETRON 8 MG/1
4 TABLET, FILM COATED ORAL EVERY 8 HOURS
Refills: 0 | Status: DISCONTINUED | OUTPATIENT
Start: 2023-07-27 | End: 2023-08-02

## 2023-07-27 RX ORDER — RANOLAZINE 500 MG/1
500 TABLET, FILM COATED, EXTENDED RELEASE ORAL
Refills: 0 | Status: DISCONTINUED | OUTPATIENT
Start: 2023-07-27 | End: 2023-08-02

## 2023-07-27 RX ORDER — IRON SUCROSE 20 MG/ML
100 INJECTION, SOLUTION INTRAVENOUS
Refills: 0 | DISCHARGE
Start: 2023-07-27 | End: 2023-07-30

## 2023-07-27 RX ORDER — ATORVASTATIN CALCIUM 80 MG/1
10 TABLET, FILM COATED ORAL AT BEDTIME
Refills: 0 | Status: DISCONTINUED | OUTPATIENT
Start: 2023-07-27 | End: 2023-08-02

## 2023-07-27 RX ORDER — HEPARIN SODIUM 5000 [USP'U]/ML
5000 INJECTION INTRAVENOUS; SUBCUTANEOUS EVERY 12 HOURS
Refills: 0 | Status: DISCONTINUED | OUTPATIENT
Start: 2023-07-27 | End: 2023-07-30

## 2023-07-27 RX ORDER — FUROSEMIDE 40 MG
20 TABLET ORAL DAILY
Refills: 0 | Status: DISCONTINUED | OUTPATIENT
Start: 2023-07-27 | End: 2023-07-27

## 2023-07-27 RX ORDER — POLYETHYLENE GLYCOL 3350 17 G/17G
17 POWDER, FOR SOLUTION ORAL DAILY
Refills: 0 | Status: DISCONTINUED | OUTPATIENT
Start: 2023-07-27 | End: 2023-08-02

## 2023-07-27 RX ADMIN — RANOLAZINE 500 MILLIGRAM(S): 500 TABLET, FILM COATED, EXTENDED RELEASE ORAL at 09:56

## 2023-07-27 RX ADMIN — HEPARIN SODIUM 5000 UNIT(S): 5000 INJECTION INTRAVENOUS; SUBCUTANEOUS at 21:39

## 2023-07-27 RX ADMIN — Medication 25 MILLIGRAM(S): at 09:54

## 2023-07-27 RX ADMIN — Medication 650 MILLIGRAM(S): at 20:00

## 2023-07-27 RX ADMIN — Medication 25 MILLIGRAM(S): at 21:38

## 2023-07-27 RX ADMIN — Medication 81 MILLIGRAM(S): at 09:55

## 2023-07-27 RX ADMIN — Medication 650 MILLIGRAM(S): at 20:50

## 2023-07-27 RX ADMIN — RANOLAZINE 500 MILLIGRAM(S): 500 TABLET, FILM COATED, EXTENDED RELEASE ORAL at 21:39

## 2023-07-27 RX ADMIN — Medication 3 MILLIGRAM(S): at 21:38

## 2023-07-27 RX ADMIN — LATANOPROST 1 DROP(S): 0.05 SOLUTION/ DROPS OPHTHALMIC; TOPICAL at 21:39

## 2023-07-27 RX ADMIN — HEPARIN SODIUM 5000 UNIT(S): 5000 INJECTION INTRAVENOUS; SUBCUTANEOUS at 09:54

## 2023-07-27 RX ADMIN — POLYETHYLENE GLYCOL 3350 17 GRAM(S): 17 POWDER, FOR SOLUTION ORAL at 09:54

## 2023-07-27 RX ADMIN — Medication 30 MILLILITER(S): at 15:22

## 2023-07-27 RX ADMIN — ESCITALOPRAM OXALATE 10 MILLIGRAM(S): 10 TABLET, FILM COATED ORAL at 09:55

## 2023-07-27 RX ADMIN — ATORVASTATIN CALCIUM 10 MILLIGRAM(S): 80 TABLET, FILM COATED ORAL at 21:39

## 2023-07-27 RX ADMIN — SODIUM CHLORIDE 1 GRAM(S): 9 INJECTION INTRAMUSCULAR; INTRAVENOUS; SUBCUTANEOUS at 21:39

## 2023-07-27 RX ADMIN — Medication 125 MICROGRAM(S): at 09:55

## 2023-07-27 RX ADMIN — SODIUM CHLORIDE 1 GRAM(S): 9 INJECTION INTRAMUSCULAR; INTRAVENOUS; SUBCUTANEOUS at 15:22

## 2023-07-27 RX ADMIN — Medication 20 MILLIGRAM(S): at 09:55

## 2023-07-27 RX ADMIN — Medication 75 MICROGRAM(S): at 09:55

## 2023-07-27 RX ADMIN — Medication 0.25 MILLIGRAM(S): at 09:55

## 2023-07-27 RX ADMIN — CLOPIDOGREL BISULFATE 75 MILLIGRAM(S): 75 TABLET, FILM COATED ORAL at 09:56

## 2023-07-27 NOTE — PATIENT PROFILE ADULT - NSPROPTRIGHTREPNAME_GEN_A__NUR
Dressing: Band-Aid Anesthesia Volume In Cc (Will Not Render If 0): 0.5 Silver Nitrate Text: The wound bed was treated with silver nitrate after the biopsy was performed. Type Of Destruction Used: Curettage Electrodesiccation And Curettage Text: The wound bed was treated with electrodesiccation and curettage after the biopsy was performed. Post-Care Instructions: I reviewed with the patient in detail post-care instructions. Patient is to keep the biopsy site dry overnight, and then apply Vaseline twice daily until healed. Pt instructed to call if notices any redness, pain, or unusual symptoms. Biopsy Method: Dermablade Hemostasis: Drysol Curettage Text: The wound bed was treated with curettage after the biopsy was performed. Lab: [id number]or (daughter); Tracie Quintero (daughter) Was A Bandage Applied: Yes Consent: Written consent was obtained and risks were reviewed including but not limited to scarring, infection, bleeding, scabbing, incomplete removal, nerve damage and allergy to anesthesia. Detail Level: Detailed Notification Instructions: Patient will be notified of biopsy results. However, patient instructed to call the office if not contacted within 2 weeks. Bill For Surgical Tray: no Cryotherapy Text: The wound bed was treated with cryotherapy after the biopsy was performed. Anesthesia Type: 1% lidocaine with epinephrine Lab Facility: [id number] Additional Anesthesia Volume In Cc (Will Not Render If 0): 0 Depth Of Biopsy: dermis Billing Type: Third-Party Bill Wound Care: Aquaphor Biopsy Type: H and E Electrodesiccation Text: The wound bed was treated with electrodesiccation after the biopsy was performed.

## 2023-07-27 NOTE — CONSULT NOTE ADULT - NS ATTEND AMEND GEN_ALL_CORE FT
89 year old woman with recent splenic laceration, admitted with weakness and nausea found to have hyponatremia, anemia.     No overt bleeding.   Has sympotmatic hyponatremia, will not plan on any endoscopic evaluation now.   As she is 89, may not pursue at all.   Can address as outpatient, most likely would recommend imaging (ie. CT colon and UGIS).   Anemia could be from prior splenic injury, ensure not ongoing/evlolving.

## 2023-07-27 NOTE — H&P ADULT - NSHPPHYSICALEXAM_GEN_ALL_CORE
Vital Signs Last 24 Hrs  T(C): 36.7 (27 Jul 2023 05:09), Max: 36.7 (26 Jul 2023 21:14)  T(F): 98 (27 Jul 2023 05:09), Max: 98.1 (26 Jul 2023 21:14)  HR: 84 (27 Jul 2023 05:09) (63 - 84)  BP: 116/56 (27 Jul 2023 05:09) (101/44 - 118/61)  BP(mean): 75 (27 Jul 2023 05:09) (75 - 75)  RR: 18 (27 Jul 2023 05:09) (18 - 24)  SpO2: 97% (27 Jul 2023 05:09) (97% - 100%)    Parameters below as of 27 Jul 2023 05:09  Patient On (Oxygen Delivery Method): room air    PHYSICAL EXAM:      Constitutional: NAD  Eyes: perrl, no conjunctival changes  ENMT: no exudates, moist oral muc, uvula midline  Neck: no JVD, no LAD  Back: no cva tenderness  Respiratory: CTA, no exp wheezes  Cardiovascular: S1S2 reg, no murmur gallop or rub  Gastrointestinal: abd soft, NT/ND + BS  Genitourinary: voiding  Extremities: FROM, no joint effusions, no edema, no clubbing , no cyanosis  Vascular: pedal pulses + bilateral, warm extremities  Neurological: non focal, mot str 5/5/ all extr  Skin: no rashes  Lymph Nodes: no LAD

## 2023-07-27 NOTE — CONSULT NOTE ADULT - ASSESSMENT
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  Started on Lasix post TAVR without prior hx of CHF.  No known hx of anemia pre TAVR--will obtain lab from PMD--Dr DiValentino--[phone].    --Hyponatremia with recent start of Lasix and new low salt diet leading to poor food intake.     Will hold Lasix for now since no overt signs of CHF.     Urine studies of no diagnostic utility due to diuretics.--check in 2 days post d/c lasix     Hold off on hypertonic solution since pt's mental status stable.     Continue po NACL tabs for now.  --Anemia ; unclear etiology.  Check stool, Iron study and Haptoglobin.     Follow up with PMD as  above to see if prior hx of work up.

## 2023-07-27 NOTE — PATIENT PROFILE ADULT - FALL HARM RISK - HARM RISK INTERVENTIONS

## 2023-07-27 NOTE — H&P ADULT - HISTORY OF PRESENT ILLNESS
88 y/o Female with PMH of Afib (s/p PPM), TAVR (On ASA and Plavix), HTN, HLD, GERD, Anxiety, Former Smoker presents to  ED BIBA for abnormal lab value. Reports she was sent in from Rehab for low hemoglobin ( 7.3 to 6.7). States she has been increasingly tired this past week. Admits to associated dizziness and intermittent headaches.

## 2023-07-27 NOTE — H&P ADULT - NSHPLABSRESULTS_GEN_ALL_CORE
8.0    13.79 )-----------( 381      ( 26 Jul 2023 16:53 )             22.5   07-26    121<L>  |  90<L>  |  20  ----------------------------<  96  4.2   |  24  |  1.05    Ca    7.7<L>      26 Jul 2023 20:36  Phos  2.7     07-26  Mg     2.3     07-26    TPro  6.2  /  Alb  2.4<L>  /  TBili  0.3  /  DBili  x   /  AST  13<L>  /  ALT  10<L>  /  AlkPhos  92  07-26

## 2023-07-27 NOTE — CONSULT NOTE ADULT - SUBJECTIVE AND OBJECTIVE BOX
Patient is a 89y old  Female who presents with a chief complaint of abnormal labs    HPI:  This is an 89 year old female with significant past medical history of recent TAVR with PPM placement, AF, HTN, HLD, GERD, anxiety, former smoker presenting to ED from rehab for low hemoglobin. Also with recent fall, with grade 2 splenic laceration. Daughter at bedside with patient. Patient endorses overall weakness with dizziness and headaches over past week. Admits to loss of appetite. Denies abdominal pain but does admit to distension and constipation with intermittent nausea. Has chronic issues with constipation. Per daughter, patient has to take daily laxatives to promote BM. Both deny any overt signs of GIB including hematemesis, hematochezia, melena, or CGE.   Hgb 8.0 here at  with report of FOB- in ED. Hyponatremia with Na+112.    PAST MEDICAL & SURGICAL HISTORY:  No pertinent past medical history    MEDICATIONS  (STANDING):  aspirin enteric coated 81 milliGRAM(s) Oral daily  atorvastatin 10 milliGRAM(s) Oral at bedtime  clopidogrel Tablet 75 milliGRAM(s) Oral daily  digoxin     Tablet 125 MICROGram(s) Oral daily  escitalopram 10 milliGRAM(s) Oral daily  heparin   Injectable 5000 Unit(s) SubCutaneous every 12 hours  latanoprost 0.005% Ophthalmic Solution 1 Drop(s) Both EYES at bedtime  levothyroxine 75 MICROGram(s) Oral daily  metoprolol tartrate 25 milliGRAM(s) Oral two times a day  polyethylene glycol 3350 17 Gram(s) Oral daily  ranolazine 500 milliGRAM(s) Oral two times a day  sodium chloride 1 Gram(s) Oral three times a day  sodium zirconium cyclosilicate 10 Gram(s) Oral daily    MEDICATIONS  (PRN):  acetaminophen     Tablet .. 650 milliGRAM(s) Oral every 6 hours PRN Temp greater or equal to 38C (100.4F), Mild Pain (1 - 3)  ALPRAZolam 0.25 milliGRAM(s) Oral every 8 hours PRN nerves  aluminum hydroxide/magnesium hydroxide/simethicone Suspension 30 milliLiter(s) Oral every 4 hours PRN Dyspepsia  melatonin 3 milliGRAM(s) Oral at bedtime PRN Insomnia  ondansetron Injectable 4 milliGRAM(s) IV Push every 8 hours PRN Nausea and/or Vomiting      Allergies    No Known Allergies    Intolerances        SOCIAL HISTORY:    FAMILY HISTORY:      REVIEW OF SYSTEMS:    CONSTITUTIONAL: No weakness, fevers or chills  EYES/ENT: No visual changes;  No vertigo or throat pain   NECK: No pain or stiffness  RESPIRATORY: No cough, wheezing, hemoptysis; No shortness of breath  CARDIOVASCULAR: No chest pain or palpitations  GASTROINTESTINAL: See HPI  GENITOURINARY: No dysuria, frequency or hematuria  NEUROLOGICAL: No numbness or weakness  SKIN: No itching, burning, rashes, or lesions   PSYCH: Normal mood and affect  All other review of systems is negative unless indicated above.    Vital Signs Last 24 Hrs  T(C): 37.5 (27 Jul 2023 07:52), Max: 37.5 (27 Jul 2023 07:52)  T(F): 99.5 (27 Jul 2023 07:52), Max: 99.5 (27 Jul 2023 07:52)  HR: 77 (27 Jul 2023 07:52) (63 - 84)  BP: 116/53 (27 Jul 2023 07:52) (101/44 - 118/61)  BP(mean): 75 (27 Jul 2023 05:09) (75 - 75)  RR: 16 (27 Jul 2023 07:52) (16 - 24)  SpO2: 96% (27 Jul 2023 07:52) (96% - 100%)    Parameters below as of 27 Jul 2023 07:52  Patient On (Oxygen Delivery Method): room air    PHYSICAL EXAM:    Constitutional: No acute distress, frail, elderly, non-toxic appearing  HEENT: masked, good phonation, not icteric  Neck: supple, no lymphadenopathy  Respiratory: clear to ascultation bilaterally, no wheezing  Cardiovascular: S1 and S2, regular rate and rhythm, no murmurs rubs or gallops  Gastrointestinal: soft, non-tender, non-distended, +bowel sounds, no rebound or guarding, no surgical scars, no drains  Extremities: No peripheral edema, no cyanosis or clubbing  Vascular: 2+ peripheral pulses, no venous stasis  Neurological: A/O x 3, no focal deficits, no asterixis  Psychiatric: Normal mood, normal affect  Skin: No rashes, not jaundiced    LABS:                        8.0    13.79 )-----------( 381      ( 26 Jul 2023 16:53 )             22.5     07-27    122<L>  |  90<L>  |  16  ----------------------------<  109<H>  4.1   |  25  |  1.08    Ca    8.3<L>      27 Jul 2023 11:13  Phos  2.7     07-26  Mg     2.3     07-26    TPro  6.2  /  Alb  2.4<L>  /  TBili  0.3  /  DBili  x   /  AST  13<L>  /  ALT  10<L>  /  AlkPhos  92  07-26    PT/INR - ( 26 Jul 2023 17:23 )   PT: 11.3 sec;   INR: 1.00 ratio         PTT - ( 26 Jul 2023 17:23 )  PTT:30.4 sec  LIVER FUNCTIONS - ( 26 Jul 2023 16:53 )  Alb: 2.4 g/dL / Pro: 6.2 gm/dL / ALK PHOS: 92 U/L / ALT: 10 U/L / AST: 13 U/L / GGT: x             RADIOLOGY & ADDITIONAL STUDIES: Patient is a 89y old  Female who presents with a chief complaint of abnormal labs    89 year old woman with past medical history of recent TAVR with PPM placement, AF, HTN, HLD, GERD, anxiety, former smoker presenting to ED from rehab for low hemoglobin. Also with recent fall, with grade 2 splenic laceration.     Daughter at bedside with patient. Patient endorses overall weakness with dizziness and headaches over past week. Admits to loss of appetite. Denies abdominal pain but does admit to distension and constipation with intermittent nausea. Has chronic issues with constipation. Per daughter, patient has to take daily laxatives to promote BM. Both deny any overt signs of GIB including hematemesis, hematochezia, melena, or coffee grind emesis. No vomiting.  Hgb 8.0 here at  with report of FOB- in ED. Hyponatremia with Na+112.    PAST MEDICAL & SURGICAL HISTORY:  TAVR  Pacemaker placement  HTN  HLD  Afib  HLD  GERD  splenic laceration    MEDICATIONS  (STANDING):  aspirin enteric coated 81 milliGRAM(s) Oral daily  atorvastatin 10 milliGRAM(s) Oral at bedtime  clopidogrel Tablet 75 milliGRAM(s) Oral daily  digoxin     Tablet 125 MICROGram(s) Oral daily  escitalopram 10 milliGRAM(s) Oral daily  heparin   Injectable 5000 Unit(s) SubCutaneous every 12 hours  latanoprost 0.005% Ophthalmic Solution 1 Drop(s) Both EYES at bedtime  levothyroxine 75 MICROGram(s) Oral daily  metoprolol tartrate 25 milliGRAM(s) Oral two times a day  polyethylene glycol 3350 17 Gram(s) Oral daily  ranolazine 500 milliGRAM(s) Oral two times a day  sodium chloride 1 Gram(s) Oral three times a day  sodium zirconium cyclosilicate 10 Gram(s) Oral daily    MEDICATIONS  (PRN):  acetaminophen     Tablet .. 650 milliGRAM(s) Oral every 6 hours PRN Temp greater or equal to 38C (100.4F), Mild Pain (1 - 3)  ALPRAZolam 0.25 milliGRAM(s) Oral every 8 hours PRN nerves  aluminum hydroxide/magnesium hydroxide/simethicone Suspension 30 milliLiter(s) Oral every 4 hours PRN Dyspepsia  melatonin 3 milliGRAM(s) Oral at bedtime PRN Insomnia  ondansetron Injectable 4 milliGRAM(s) IV Push every 8 hours PRN Nausea and/or Vomiting      Allergies    No Known Allergies    Intolerances    SOCIAL HISTORY:  former smoker, no drinking or drugs    FAMILY HISTORY:  no colon or stomach cancer    REVIEW OF SYSTEMS:    CONSTITUTIONAL: + weakness, no fevers or chills  EYES/ENT: No visual changes;  No vertigo or throat pain   NECK: No pain or stiffness  RESPIRATORY: No cough, wheezing, hemoptysis; No shortness of breath  CARDIOVASCULAR: No chest pain or palpitations  GASTROINTESTINAL: See HPI  GENITOURINARY: No dysuria, frequency or hematuria  NEUROLOGICAL: No numbness or weakness, +headache  SKIN: No itching, burning, rashes, or lesions   PSYCH: Normal mood and affect  All other review of systems is negative unless indicated above.    Vital Signs Last 24 Hrs  T(C): 37.5 (27 Jul 2023 07:52), Max: 37.5 (27 Jul 2023 07:52)  T(F): 99.5 (27 Jul 2023 07:52), Max: 99.5 (27 Jul 2023 07:52)  HR: 77 (27 Jul 2023 07:52) (63 - 84)  BP: 116/53 (27 Jul 2023 07:52) (101/44 - 118/61)  BP(mean): 75 (27 Jul 2023 05:09) (75 - 75)  RR: 16 (27 Jul 2023 07:52) (16 - 24)  SpO2: 96% (27 Jul 2023 07:52) (96% - 100%)    Parameters below as of 27 Jul 2023 07:52  Patient On (Oxygen Delivery Method): room air    PHYSICAL EXAM:    Constitutional: No acute distress, frail, elderly, non-toxic appearing  HEENT: unmasked, good phonation, not icteric  Neck: supple, no lymphadenopathy  Respiratory: clear to ascultation bilaterally, no wheezing  Cardiovascular: S1 and S2, regular rate and rhythm, no murmurs rubs or gallops  Gastrointestinal: soft, non-tender, non-distended, +bowel sounds, no rebound or guarding  Extremities: No peripheral edema, no cyanosis or clubbing  Vascular: 2+ peripheral pulses, no venous stasis  Neurological: A/O x 3, no focal deficits, no asterixis  Psychiatric: Normal mood, normal affect  Skin: No rashes, not jaundiced    LABS:                        8.0    13.79 )-----------( 381      ( 26 Jul 2023 16:53 )             22.5     07-27    122<L>  |  90<L>  |  16  ----------------------------<  109<H>  4.1   |  25  |  1.08    Ca    8.3<L>      27 Jul 2023 11:13  Phos  2.7     07-26  Mg     2.3     07-26    TPro  6.2  /  Alb  2.4<L>  /  TBili  0.3  /  DBili  x   /  AST  13<L>  /  ALT  10<L>  /  AlkPhos  92  07-26    PT/INR - ( 26 Jul 2023 17:23 )   PT: 11.3 sec;   INR: 1.00 ratio         PTT - ( 26 Jul 2023 17:23 )  PTT:30.4 sec  LIVER FUNCTIONS - ( 26 Jul 2023 16:53 )  Alb: 2.4 g/dL / Pro: 6.2 gm/dL / ALK PHOS: 92 U/L / ALT: 10 U/L / AST: 13 U/L / GGT: x

## 2023-07-27 NOTE — CONSULT NOTE ADULT - ASSESSMENT
89 year old female with recent TAVR/PPM, hyponatremia, and anemia    Imp: Acute on chronic multifactorial anemia, OB negative in ED without overt signs of GIB    Rec:  ::No endoscopic evaluation indicated at this time  ::Iron studies  ::Nutrition eval  ::Daily bowel regimen  ::Continue trend HH  ::Appreciate nephro eval and recs 89 year old female with recent TAVR/PPM, hyponatremia, and anemia    Imp: Acute on chronic multifactorial anemia, OB negative in ED without overt signs of GIB    Rec:  ::No emergent endoscopic evaluation indicated at this time  ::Iron studies  ::Nutrition eval  ::Daily bowel regimen  ::Continue trend HH  ::Appreciate nephro eval and recs

## 2023-07-27 NOTE — ED ADULT NURSE REASSESSMENT NOTE - NS ED NURSE REASSESS COMMENT FT1
Assumed care of patient, AxOX4, patient verbalizing feeling anxious at present. Calmed and reassured. VSS. Patient changed into hospital gown, brief changed, purewick in place, multiple ecchymotic areas on admission to left posterior shoulder and scapula area and b/l upper extremities, right knee and two ecchymotic areas to left lower extremity. Comfort and safety measures maintained, transferred to hospital bed, bed alarm activated, call bell within reach, oriented to room and surroundings. Updated on plan of care, will continue to monitor.

## 2023-07-27 NOTE — CONSULT NOTE ADULT - SUBJECTIVE AND OBJECTIVE BOX
Chief complaints.  Sent from rehab for abnormal labs    HPI:  88 yo woman with PMHX of A fib, PPM, HTN and CHF on lasix 20 mg daily, sent to ED for low HGB.  Hx of recent fall on , with LOC, sustained grade 2 ruptured spleen and PPM placed on  and sent to [facility] rehab.  Reports weakness and abdominal discomfort for one week.      PMHX and PSHX.  --A FIB  --Syncope ( 2023)  --PPM ( on 2023)  --CHF   --HTN      Home Medications:   * Patient Currently Takes Medications as of 2023 23:23 documented in Structured Notes  · 	Venofer 20 mg/mL intravenous solution: Last Dose Taken:  , 100 milligram(s) intravenously once a day for 3 days  · 	sodium chloride 1 g oral tablet: Last Dose Taken:  , 1 tab(s) orally 3 times a day for 3 days  · 	acetaminophen 325 mg oral tablet: Last Dose Taken:  , 2 tab(s) orally every 6 hours as needed for pain  · 	aspirin 81 mg oral delayed release tablet: Last Dose Taken:  , 1 tab(s) orally once a day  · 	clopidogrel 75 mg oral tablet: Last Dose Taken:  , 1 tab(s) orally once a day  · 	Colace 100 mg oral capsule: Last Dose Taken:  , 2 cap(s) orally once a day (at bedtime)  · 	digoxin 125 mcg (0.125 mg) oral tablet: Last Dose Taken:  , 1 tab(s) orally every other day  · 	bisacodyl 10 mg rectal suppository: Last Dose Taken:  , 1 suppository(ies) rectally prn as needed for  constipation if no relief from MOM  · 	escitalopram 5 mg oral tablet: Last Dose Taken:  , 2 tab(s) orally once a day  · 	Fleet Enema 19 g-7 g rectal enema: Last Dose Taken:  , 1 each rectally prn as needed for  constipation if no relief from MOM or Dulcolax  · 	furosemide 20 mg oral tablet: Last Dose Taken:  , 1 tab(s) orally once a day HOLD for SBP< 110  · 	latanoprost 0.005% ophthalmic solution: Last Dose Taken:  , 1 drop(s) in each eye once a day (at bedtime)  · 	levothyroxine 75 mcg (0.075 mg) oral tablet: Last Dose Taken:  , 1 tab(s) orally once a day  · 	Lipitor 10 mg oral tablet: Last Dose Taken:  , 1 tab(s) orally once a day (at bedtime)  · 	Lokelma 10 g oral powder for reconstitution: Last Dose Taken:  , 10 gram(s) orally every 12 hours  · 	metoprolol tartrate 25 mg oral tablet: Last Dose Taken:  , 1 tab(s) orally 2 times a day , HOLD for BP<110 or Pulse<60  · 	Milk of Magnesia 8% oral suspension: Last Dose Taken:  , 30 milliliter(s) orally every 3 days as needed for  constipation  · 	polyethylene glycol 3350 oral powder for reconstitution: Last Dose Taken:  , 17 gram(s) orally once a day  · 	Pepcid 40 mg oral tablet: Last Dose Taken:  , 1 tab(s) orally once a day  · 	ranolazine 500 mg oral tablet, extended release: Last Dose Taken:  , 1 tab(s) orally 2 times a day    FAMILY HISTORY:  N/C    SOCIAL HISTORY :  Former smoker,  No ETOH.    Allergies :  No Known Allergies    REVIEW OF SYSTEMS:           MEDICATIONS  (STANDING):  aspirin enteric coated 81 milliGRAM(s) Oral daily  atorvastatin 10 milliGRAM(s) Oral at bedtime  clopidogrel Tablet 75 milliGRAM(s) Oral daily  digoxin     Tablet 125 MICROGram(s) Oral daily  escitalopram 10 milliGRAM(s) Oral daily  furosemide    Tablet 20 milliGRAM(s) Oral daily  heparin   Injectable 5000 Unit(s) SubCutaneous every 12 hours  latanoprost 0.005% Ophthalmic Solution 1 Drop(s) Both EYES at bedtime  levothyroxine 75 MICROGram(s) Oral daily  metoprolol tartrate 25 milliGRAM(s) Oral two times a day  polyethylene glycol 3350 17 Gram(s) Oral daily  ranolazine 500 milliGRAM(s) Oral two times a day  sodium chloride 1 Gram(s) Oral three times a day  sodium zirconium cyclosilicate 10 Gram(s) Oral daily    MEDICATIONS  (PRN):  acetaminophen     Tablet .. 650 milliGRAM(s) Oral every 6 hours PRN Temp greater or equal to 38C (100.4F), Mild Pain (1 - 3)  ALPRAZolam 0.25 milliGRAM(s) Oral every 8 hours PRN nerves  aluminum hydroxide/magnesium hydroxide/simethicone Suspension 30 milliLiter(s) Oral every 4 hours PRN Dyspepsia  melatonin 3 milliGRAM(s) Oral at bedtime PRN Insomnia  ondansetron Injectable 4 milliGRAM(s) IV Push every 8 hours PRN Nausea and/or Vomiting    Vital Signs Last 24 Hrs  T(C): 37.5 (2023 07:52), Max: 37.5 (2023 07:52)  T(F): 99.5 (2023 07:52), Max: 99.5 (2023 07:52)  HR: 77 (2023 07:52) (63 - 84)  BP: 116/53 (2023 07:52) (101/44 - 118/61)  BP(mean): 75 (2023 05:09) (75 - 75)  RR: 16 (2023 07:52) (16 - 24)  SpO2: 96% (2023 07:52) (96% - 100%)    Parameters below as of 2023 07:52  Patient On (Oxygen Delivery Method): room air      Daily Height in cm: 162.56 (2023 16:19)    Daily   I&O's Summary      PHYSICAL EXAM:  GEN:   HEENT:   NECK   CV:   LUNGS:   ABD:   EXT:     LABS:                        8.0    13.79 )-----------( 381      ( 2023 16:53 )             22.5     07-26    121<L>  |  90<L>  |  20  ----------------------------<  96  4.2   |  24  |  1.05    Ca    7.7<L>      2023 20:36  Phos  2.7     -  Mg     2.3     26    TPro  6.2  /  Alb  2.4<L>  /  TBili  0.3  /  DBili  x   /  AST  13<L>  /  ALT  10<L>  /  AlkPhos  92  07-26    PT/INR - ( 2023 17:23 )   PT: 11.3 sec;   INR: 1.00 ratio         PTT - ( 2023 17:23 )  PTT:30.4 sec  Urinalysis Basic - ( 2023 20:36 )    Color: Yellow / Appearance: Clear / S.015 / pH: x  Gluc: 96 mg/dL / Ketone: Negative  / Bili: Negative / Urobili: Negative   Blood: x / Protein: Trace mg/dL / Nitrite: Negative   Leuk Esterase: Trace / RBC: Negative /HPF / WBC 0-2 /HPF   Sq Epi: x / Non Sq Epi: x / Bacteria: Occasional      Magnesium: 2.3 mg/dL ( @ 20:36)  Phosphorus: 2.7 mg/dL ( @ 20:36)  Magnesium: 2.3 mg/dL ( @ 16:53)  Phosphorus: 3.1 mg/dL ( @ 16:53)       Chief complaints.  Sent from rehab for abnormal labs    HPI:  88 yo woman with PMHX of A fib, HTN and started on  Lasix 20 mg daily since TAVR ( 2023) , sent to ED for low HGB.  Hx of recent fall on , with LOC, sustained grade 2 ruptured spleen and PPM placed on  and sent to [facility] rehab.  Reports weakness and abdominal discomfort for one week.  Since at rehab, has been eating  very poorly due to being give "no salt diet".  Had been on no restrictions prior to rehab.  Cannot recall when she was started on Lasix.  Med rec with NACL tabs for 3 days with unclear hx.   No previous hx of "kidney problems " per pt.  Per pt's daughter, no hx of CHF, started on Lasix post TAVR for fluid around heart"  No hx of anemia prior to recent hospitalization at [facility] post fall.    PMHX and PSHX.  --A FIB  --Syncope ( 2023)  --PPM ( on 2023)  --HTN  --TAVR ( 2023 at [place])      Home Medications:   * Patient Currently Takes Medications as of 2023 23:23 documented in Structured Notes  · 	Venofer 20 mg/mL intravenous solution: Last Dose Taken:  , 100 milligram(s) intravenously once a day for 3 days  · 	sodium chloride 1 g oral tablet: Last Dose Taken:  , 1 tab(s) orally 3 times a day for 3 days  · 	acetaminophen 325 mg oral tablet: Last Dose Taken:  , 2 tab(s) orally every 6 hours as needed for pain  · 	aspirin 81 mg oral delayed release tablet: Last Dose Taken:  , 1 tab(s) orally once a day  · 	clopidogrel 75 mg oral tablet: Last Dose Taken:  , 1 tab(s) orally once a day  · 	Colace 100 mg oral capsule: Last Dose Taken:  , 2 cap(s) orally once a day (at bedtime)  · 	digoxin 125 mcg (0.125 mg) oral tablet: Last Dose Taken:  , 1 tab(s) orally every other day  · 	bisacodyl 10 mg rectal suppository: Last Dose Taken:  , 1 suppository(ies) rectally prn as needed for  constipation if no relief from MOM  · 	escitalopram 5 mg oral tablet: Last Dose Taken:  , 2 tab(s) orally once a day  · 	Fleet Enema 19 g-7 g rectal enema: Last Dose Taken:  , 1 each rectally prn as needed for  constipation if no relief from MOM or Dulcolax  · 	furosemide 20 mg oral tablet: Last Dose Taken:  , 1 tab(s) orally once a day HOLD for SBP< 110  · 	latanoprost 0.005% ophthalmic solution: Last Dose Taken:  , 1 drop(s) in each eye once a day (at bedtime)  · 	levothyroxine 75 mcg (0.075 mg) oral tablet: Last Dose Taken:  , 1 tab(s) orally once a day  · 	Lipitor 10 mg oral tablet: Last Dose Taken:  , 1 tab(s) orally once a day (at bedtime)  · 	Lokelma 10 g oral powder for reconstitution: Last Dose Taken:  , 10 gram(s) orally every 12 hours  · 	metoprolol tartrate 25 mg oral tablet: Last Dose Taken:  , 1 tab(s) orally 2 times a day , HOLD for BP<110 or Pulse<60  · 	Milk of Magnesia 8% oral suspension: Last Dose Taken:  , 30 milliliter(s) orally every 3 days as needed for  constipation  · 	polyethylene glycol 3350 oral powder for reconstitution: Last Dose Taken:  , 17 gram(s) orally once a day  · 	Pepcid 40 mg oral tablet: Last Dose Taken:  , 1 tab(s) orally once a day  · 	ranolazine 500 mg oral tablet, extended release: Last Dose Taken:  , 1 tab(s) orally 2 times a day    FAMILY HISTORY:  N/C    SOCIAL HISTORY :  Former smoker,  No ETOH.    Allergies :  No Known Allergies    REVIEW OF SYSTEMS:   Denies SOB  c/o constipation.  Denies chest pain    MEDICATIONS  (STANDING):  aspirin enteric coated 81 milliGRAM(s) Oral daily  atorvastatin 10 milliGRAM(s) Oral at bedtime  clopidogrel Tablet 75 milliGRAM(s) Oral daily  digoxin     Tablet 125 MICROGram(s) Oral daily  escitalopram 10 milliGRAM(s) Oral daily  furosemide    Tablet 20 milliGRAM(s) Oral daily  heparin   Injectable 5000 Unit(s) SubCutaneous every 12 hours  latanoprost 0.005% Ophthalmic Solution 1 Drop(s) Both EYES at bedtime  levothyroxine 75 MICROGram(s) Oral daily  metoprolol tartrate 25 milliGRAM(s) Oral two times a day  polyethylene glycol 3350 17 Gram(s) Oral daily  ranolazine 500 milliGRAM(s) Oral two times a day  sodium chloride 1 Gram(s) Oral three times a day  sodium zirconium cyclosilicate 10 Gram(s) Oral daily    MEDICATIONS  (PRN):  acetaminophen     Tablet .. 650 milliGRAM(s) Oral every 6 hours PRN Temp greater or equal to 38C (100.4F), Mild Pain (1 - 3)  ALPRAZolam 0.25 milliGRAM(s) Oral every 8 hours PRN nerves  aluminum hydroxide/magnesium hydroxide/simethicone Suspension 30 milliLiter(s) Oral every 4 hours PRN Dyspepsia  melatonin 3 milliGRAM(s) Oral at bedtime PRN Insomnia  ondansetron Injectable 4 milliGRAM(s) IV Push every 8 hours PRN Nausea and/or Vomiting    Vital Signs Last 24 Hrs  T(C): 37.5 (2023 07:52), Max: 37.5 (2023 07:52)  T(F): 99.5 (2023 07:52), Max: 99.5 (2023 07:52)  HR: 77 (2023 07:52) (63 - 84)  BP: 116/53 (2023 07:52) (101/44 - 118/61)  BP(mean): 75 (2023 05:09) (75 - 75)  RR: 16 (2023 07:52) (16 - 24)  SpO2: 96% (2023 07:52) (96% - 100%)    Parameters below as of 2023 07:52  Patient On (Oxygen Delivery Method): room air      Daily Height in cm: 162.56 (2023 16:19)    Daily   I&O's Summary      PHYSICAL EXAM:  GEN: No distress  HEENT: WNL  NECK : supple  CV: S1S2   LUNGS: occ rhonchi  ABD: soft  EXT: no edema    LABS:                        8.0    13.79 )-----------( 381      ( 2023 16:53 )             22.5     07-26    121<L>  |  90<L>  |  20  ----------------------------<  96  4.2   |  24  |  1.05    Ca    7.7<L>      2023 20:36  Phos  2.7       Mg     2.3         TPro  6.2  /  Alb  2.4<L>  /  TBili  0.3  /  DBili  x   /  AST  13<L>  /  ALT  10<L>  /  AlkPhos  92      PT/INR - ( 2023 17:23 )   PT: 11.3 sec;   INR: 1.00 ratio         PTT - ( 2023 17:23 )  PTT:30.4 sec  Urinalysis Basic - ( 2023 20:36 )    Color: Yellow / Appearance: Clear / S.015 / pH: x  Gluc: 96 mg/dL / Ketone: Negative  / Bili: Negative / Urobili: Negative   Blood: x / Protein: Trace mg/dL / Nitrite: Negative   Leuk Esterase: Trace / RBC: Negative /HPF / WBC 0-2 /HPF   Sq Epi: x / Non Sq Epi: x / Bacteria: Occasional      Magnesium: 2.3 mg/dL ( @ 20:36)  Phosphorus: 2.7 mg/dL ( @ 20:36)  Magnesium: 2.3 mg/dL ( @ 16:53)  Phosphorus: 3.1 mg/dL ( @ 16:53)

## 2023-07-28 LAB
ADD ON TEST-SPECIMEN IN LAB: SIGNIFICANT CHANGE UP
ANION GAP SERPL CALC-SCNC: 7 MMOL/L — SIGNIFICANT CHANGE UP (ref 5–17)
BUN SERPL-MCNC: 16 MG/DL — SIGNIFICANT CHANGE UP (ref 7–23)
CALCIUM SERPL-MCNC: 8.3 MG/DL — LOW (ref 8.5–10.1)
CHLORIDE SERPL-SCNC: 92 MMOL/L — LOW (ref 96–108)
CO2 SERPL-SCNC: 23 MMOL/L — SIGNIFICANT CHANGE UP (ref 22–31)
CREAT SERPL-MCNC: 1.12 MG/DL — SIGNIFICANT CHANGE UP (ref 0.5–1.3)
DIGOXIN SERPL-MCNC: 1.01 NG/ML — SIGNIFICANT CHANGE UP (ref 0.8–2)
EGFR: 47 ML/MIN/1.73M2 — LOW
FERRITIN SERPL-MCNC: 383 NG/ML — HIGH (ref 13–330)
GLUCOSE SERPL-MCNC: 92 MG/DL — SIGNIFICANT CHANGE UP (ref 70–99)
HCT VFR BLD CALC: 21.8 % — LOW (ref 34.5–45)
HGB BLD-MCNC: 7.5 G/DL — LOW (ref 11.5–15.5)
MCHC RBC-ENTMCNC: 30.7 PG — SIGNIFICANT CHANGE UP (ref 27–34)
MCHC RBC-ENTMCNC: 34.4 GM/DL — SIGNIFICANT CHANGE UP (ref 32–36)
MCV RBC AUTO: 89.3 FL — SIGNIFICANT CHANGE UP (ref 80–100)
OSMOLALITY UR: 378 MOSM/KG — SIGNIFICANT CHANGE UP (ref 50–1200)
PLATELET # BLD AUTO: 413 K/UL — HIGH (ref 150–400)
POTASSIUM SERPL-MCNC: 4.2 MMOL/L — SIGNIFICANT CHANGE UP (ref 3.5–5.3)
POTASSIUM SERPL-SCNC: 4.2 MMOL/L — SIGNIFICANT CHANGE UP (ref 3.5–5.3)
RBC # BLD: 2.44 M/UL — LOW (ref 3.8–5.2)
RBC # FLD: 12.9 % — SIGNIFICANT CHANGE UP (ref 10.3–14.5)
SODIUM SERPL-SCNC: 122 MMOL/L — LOW (ref 135–145)
SODIUM UR-SCNC: 77 MMOL/L — SIGNIFICANT CHANGE UP
URATE SERPL-MCNC: 5.2 MG/DL — SIGNIFICANT CHANGE UP (ref 2.5–7)
WBC # BLD: 17.9 K/UL — HIGH (ref 3.8–10.5)
WBC # FLD AUTO: 17.9 K/UL — HIGH (ref 3.8–10.5)

## 2023-07-28 PROCEDURE — 99233 SBSQ HOSP IP/OBS HIGH 50: CPT

## 2023-07-28 RX ORDER — PANTOPRAZOLE SODIUM 20 MG/1
40 TABLET, DELAYED RELEASE ORAL
Refills: 0 | Status: DISCONTINUED | OUTPATIENT
Start: 2023-07-28 | End: 2023-08-02

## 2023-07-28 RX ADMIN — ATORVASTATIN CALCIUM 10 MILLIGRAM(S): 80 TABLET, FILM COATED ORAL at 21:25

## 2023-07-28 RX ADMIN — CLOPIDOGREL BISULFATE 75 MILLIGRAM(S): 75 TABLET, FILM COATED ORAL at 11:40

## 2023-07-28 RX ADMIN — RANOLAZINE 500 MILLIGRAM(S): 500 TABLET, FILM COATED, EXTENDED RELEASE ORAL at 11:39

## 2023-07-28 RX ADMIN — Medication 25 MILLIGRAM(S): at 11:40

## 2023-07-28 RX ADMIN — HEPARIN SODIUM 5000 UNIT(S): 5000 INJECTION INTRAVENOUS; SUBCUTANEOUS at 21:24

## 2023-07-28 RX ADMIN — SODIUM CHLORIDE 1 GRAM(S): 9 INJECTION INTRAMUSCULAR; INTRAVENOUS; SUBCUTANEOUS at 18:10

## 2023-07-28 RX ADMIN — ESCITALOPRAM OXALATE 10 MILLIGRAM(S): 10 TABLET, FILM COATED ORAL at 11:40

## 2023-07-28 RX ADMIN — Medication 75 MICROGRAM(S): at 05:30

## 2023-07-28 RX ADMIN — SODIUM CHLORIDE 1 GRAM(S): 9 INJECTION INTRAMUSCULAR; INTRAVENOUS; SUBCUTANEOUS at 11:38

## 2023-07-28 RX ADMIN — SODIUM CHLORIDE 1 GRAM(S): 9 INJECTION INTRAMUSCULAR; INTRAVENOUS; SUBCUTANEOUS at 21:25

## 2023-07-28 RX ADMIN — RANOLAZINE 500 MILLIGRAM(S): 500 TABLET, FILM COATED, EXTENDED RELEASE ORAL at 21:25

## 2023-07-28 RX ADMIN — Medication 125 MICROGRAM(S): at 11:39

## 2023-07-28 RX ADMIN — LATANOPROST 1 DROP(S): 0.05 SOLUTION/ DROPS OPHTHALMIC; TOPICAL at 21:25

## 2023-07-28 RX ADMIN — HEPARIN SODIUM 5000 UNIT(S): 5000 INJECTION INTRAVENOUS; SUBCUTANEOUS at 11:41

## 2023-07-28 RX ADMIN — Medication 25 MILLIGRAM(S): at 21:25

## 2023-07-28 RX ADMIN — Medication 81 MILLIGRAM(S): at 11:39

## 2023-07-28 RX ADMIN — POLYETHYLENE GLYCOL 3350 17 GRAM(S): 17 POWDER, FOR SOLUTION ORAL at 11:41

## 2023-07-28 NOTE — ED ADULT NURSE REASSESSMENT NOTE - NS ED NURSE REASSESS COMMENT FT1
Pt received A+Ox4. VSS. Pt c/o still feeling weak. No other complaints. Pt and daughter instructed in plan of care. Monitored closely. Call bell in reach, bed alarm set.

## 2023-07-28 NOTE — PROGRESS NOTE ADULT - SUBJECTIVE AND OBJECTIVE BOX
CHIEF COMPLAINT/INTERVAL HISTORY:    Patient is a 89y old  Female who presents with a chief complaint of anemia, hyponatremia (27 Jul 2023 13:59)      HPI:  89 y/o Female with PMH of Afib (s/p PPM), TAVR (On ASA and Plavix), HTN, HLD, GERD, Anxiety, Former Smoker presents to  ED BIBA for abnormal lab value. Reports she was sent in from Rehab for low hemoglobin ( 7.3 to 6.7). States she has been increasingly tired this past week. Admits to associated dizziness and intermittent headaches.     (27 Jul 2023 06:43)      SUBJECTIVE & OBJECTIVE: Pt seen and examined at bedside.     ICU Vital Signs Last 24 Hrs  T(C): 37.3 (28 Jul 2023 08:00), Max: 37.3 (28 Jul 2023 08:00)  T(F): 99.1 (28 Jul 2023 08:00), Max: 99.1 (28 Jul 2023 08:00)  HR: 86 (28 Jul 2023 08:00) (80 - 86)  BP: 123/45 (28 Jul 2023 08:00) (104/53 - 138/63)  BP(mean): 66 (28 Jul 2023 08:00) (66 - 66)  ABP: --  ABP(mean): --  RR: 20 (28 Jul 2023 08:00) (18 - 20)  SpO2: 95% (28 Jul 2023 08:00) (94% - 99%)    O2 Parameters below as of 28 Jul 2023 05:42  Patient On (Oxygen Delivery Method): room air              MEDICATIONS  (STANDING):  aspirin enteric coated 81 milliGRAM(s) Oral daily  atorvastatin 10 milliGRAM(s) Oral at bedtime  clopidogrel Tablet 75 milliGRAM(s) Oral daily  digoxin     Tablet 125 MICROGram(s) Oral daily  escitalopram 10 milliGRAM(s) Oral daily  heparin   Injectable 5000 Unit(s) SubCutaneous every 12 hours  latanoprost 0.005% Ophthalmic Solution 1 Drop(s) Both EYES at bedtime  levothyroxine 75 MICROGram(s) Oral daily  metoprolol tartrate 25 milliGRAM(s) Oral two times a day  polyethylene glycol 3350 17 Gram(s) Oral daily  ranolazine 500 milliGRAM(s) Oral two times a day  sodium chloride 1 Gram(s) Oral three times a day  sodium zirconium cyclosilicate 10 Gram(s) Oral daily    MEDICATIONS  (PRN):  acetaminophen     Tablet .. 650 milliGRAM(s) Oral every 6 hours PRN Temp greater or equal to 38C (100.4F), Mild Pain (1 - 3)  ALPRAZolam 0.25 milliGRAM(s) Oral every 8 hours PRN nerves  aluminum hydroxide/magnesium hydroxide/simethicone Suspension 30 milliLiter(s) Oral every 4 hours PRN Dyspepsia  melatonin 3 milliGRAM(s) Oral at bedtime PRN Insomnia  ondansetron Injectable 4 milliGRAM(s) IV Push every 8 hours PRN Nausea and/or Vomiting        PHYSICAL EXAM:    GENERAL: NAD, well-groomed, well-developed  NERVOUS SYSTEM:  Alert & Oriented X3, Motor Strength 5/5 B/L upper and lower extremities; DTRs 2+ intact and symmetric  CHEST/LUNG: Clear to auscultation bilaterally; No rales, rhonchi, wheezing, or rubs  HEART: Regular rate and rhythm; No murmurs, rubs, or gallops  ABDOMEN: Soft, Nontender, Nondistended; Bowel sounds present  EXTREMITIES:  2+ Peripheral Pulses, No clubbing, cyanosis, or edema    LABS:                        7.5    17.90 )-----------( 413      ( 28 Jul 2023 05:57 )             21.8     07-28    122<L>  |  92<L>  |  16  ----------------------------<  92  4.2   |  23  |  1.12    Ca    8.3<L>      28 Jul 2023 05:57  Phos  2.7     07-26  Mg     2.3     07-26    TPro  6.2  /  Alb  2.4<L>  /  TBili  0.3  /  DBili  x   /  AST  13<L>  /  ALT  10<L>  /  AlkPhos  92  07-26    PT/INR - ( 26 Jul 2023 17:23 )   PT: 11.3 sec;   INR: 1.00 ratio         PTT - ( 26 Jul 2023 17:23 )  PTT:30.4 sec  Urinalysis Basic - ( 28 Jul 2023 05:57 )    Color: x / Appearance: x / SG: x / pH: x  Gluc: 92 mg/dL / Ketone: x  / Bili: x / Urobili: x   Blood: x / Protein: x / Nitrite: x   Leuk Esterase: x / RBC: x / WBC x   Sq Epi: x / Non Sq Epi: x / Bacteria: x                 CHIEF COMPLAINT/INTERVAL HISTORY:    Patient is a 89y old  Female who presents with a chief complaint of anemia, hyponatremia (27 Jul 2023 13:59)      HPI:  89 y/o Female with PMH of Afib (s/p PPM), TAVR (On ASA and Plavix), HTN, HLD, GERD, Anxiety, Former Smoker presents to  ED BIBA for abnormal lab value. Reports she was sent in from Rehab for low hemoglobin ( 7.3 to 6.7). States she has been increasingly tired this past week. Admits to associated dizziness and intermittent headaches.    Pt is also orthostatic;  lower; it was 9.2 on July 12th      Vital Signs Last 24 Hrs  T(C): 37.3 (28 Jul 2023 08:00), Max: 37.3 (28 Jul 2023 08:00)  T(F): 99.1 (28 Jul 2023 08:00), Max: 99.1 (28 Jul 2023 08:00)  HR: 86 (28 Jul 2023 08:00) (80 - 86)  BP: 123/45 (28 Jul 2023 08:00) (104/53 - 138/63)  BP(mean): 66 (28 Jul 2023 08:00) (66 - 66)  RR: 20 (28 Jul 2023 08:00) (18 - 20)  SpO2: 95% (28 Jul 2023 08:00) (94% - 99%)    Parameters below as of 28 Jul 2023 05:42  Patient On (Oxygen Delivery Method): room air          MEDICATIONS  (STANDING):  aspirin enteric coated 81 milliGRAM(s) Oral daily  atorvastatin 10 milliGRAM(s) Oral at bedtime  clopidogrel Tablet 75 milliGRAM(s) Oral daily  digoxin     Tablet 125 MICROGram(s) Oral daily  escitalopram 10 milliGRAM(s) Oral daily  heparin   Injectable 5000 Unit(s) SubCutaneous every 12 hours  latanoprost 0.005% Ophthalmic Solution 1 Drop(s) Both EYES at bedtime  levothyroxine 75 MICROGram(s) Oral daily  metoprolol tartrate 25 milliGRAM(s) Oral two times a day  polyethylene glycol 3350 17 Gram(s) Oral daily  ranolazine 500 milliGRAM(s) Oral two times a day  sodium chloride 1 Gram(s) Oral three times a day  sodium zirconium cyclosilicate 10 Gram(s) Oral daily    MEDICATIONS  (PRN):  acetaminophen     Tablet .. 650 milliGRAM(s) Oral every 6 hours PRN Temp greater or equal to 38C (100.4F), Mild Pain (1 - 3)  ALPRAZolam 0.25 milliGRAM(s) Oral every 8 hours PRN nerves  aluminum hydroxide/magnesium hydroxide/simethicone Suspension 30 milliLiter(s) Oral every 4 hours PRN Dyspepsia  melatonin 3 milliGRAM(s) Oral at bedtime PRN Insomnia  ondansetron Injectable 4 milliGRAM(s) IV Push every 8 hours PRN Nausea and/or Vomiting        PHYSICAL EXAM:    GENERAL: NAD, well-groomed, well-developed  NERVOUS SYSTEM:  Alert & Oriented X3, Motor Strength 5/5 B/L upper and lower extremities; DTRs 2+ intact and symmetric  CHEST/LUNG: Clear to auscultation bilaterally; No rales, rhonchi, wheezing, or rubs  HEART: Regular rate and rhythm; No murmurs, rubs, or gallops  ABDOMEN: Soft, Nontender, Nondistended; Bowel sounds present  EXTREMITIES:  2+ Peripheral Pulses, No clubbing, cyanosis, or edema    LABS:                                   7.5    17.90 )-----------( 413      ( 28 Jul 2023 05:57 )             21.8   07-28    122<L>  |  92<L>  |  16  ----------------------------<  92  4.2   |  23  |  1.12    Ca    8.3<L>      28 Jul 2023 05:57  Phos  2.7     07-26  Mg     2.3     07-26    TPro  6.2  /  Alb  2.4<L>  /  TBili  0.3  /  DBili  x   /  AST  13<L>  /  ALT  10<L>  /  AlkPhos  92  07-26      Color: x / Appearance: x / SG: x / pH: x  Gluc: 92 mg/dL / Ketone: x  / Bili: x / Urobili: x   Blood: x / Protein: x / Nitrite: x   Leuk Esterase: x / RBC: x / WBC x   Sq Epi: x / Non Sq Epi: x / Bacteria: x                 CHIEF COMPLAINT/INTERVAL HISTORY:    Patient is a 89y old  Female who presents with a chief complaint of anemia, hyponatremia (27 Jul 2023 13:59)      HPI:  88 y/o Female with PMH of Afib (s/p PPM), TAVR (On ASA and Plavix), HTN, HLD, GERD, Anxiety, Former Smoker presents to  ED BIBA for abnormal lab value. Reports she was sent in from Rehab for low hemoglobin ( 7.3 to 6.7). States she has been increasingly tired this past week. Admits to associated dizziness and intermittent headaches.    Pt is also orthostatic;  lower; it was 9.2 on July 12th      Vital Signs Last 24 Hrs  T(C): 37.3 (28 Jul 2023 08:00), Max: 37.3 (28 Jul 2023 08:00)  T(F): 99.1 (28 Jul 2023 08:00), Max: 99.1 (28 Jul 2023 08:00)  HR: 86 (28 Jul 2023 08:00) (80 - 86)  BP: 123/45 (28 Jul 2023 08:00) (104/53 - 138/63)  BP(mean): 66 (28 Jul 2023 08:00) (66 - 66)  RR: 20 (28 Jul 2023 08:00) (18 - 20)  SpO2: 95% (28 Jul 2023 08:00) (94% - 99%)    Parameters below as of 28 Jul 2023 05:42  Patient On (Oxygen Delivery Method): room air          MEDICATIONS  (STANDING):  aspirin enteric coated 81 milliGRAM(s) Oral daily  atorvastatin 10 milliGRAM(s) Oral at bedtime  clopidogrel Tablet 75 milliGRAM(s) Oral daily  digoxin     Tablet 125 MICROGram(s) Oral daily  escitalopram 10 milliGRAM(s) Oral daily  heparin   Injectable 5000 Unit(s) SubCutaneous every 12 hours  latanoprost 0.005% Ophthalmic Solution 1 Drop(s) Both EYES at bedtime  levothyroxine 75 MICROGram(s) Oral daily  metoprolol tartrate 25 milliGRAM(s) Oral two times a day  polyethylene glycol 3350 17 Gram(s) Oral daily  ranolazine 500 milliGRAM(s) Oral two times a day  sodium chloride 1 Gram(s) Oral three times a day  sodium zirconium cyclosilicate 10 Gram(s) Oral daily    MEDICATIONS  (PRN):  acetaminophen     Tablet .. 650 milliGRAM(s) Oral every 6 hours PRN Temp greater or equal to 38C (100.4F), Mild Pain (1 - 3)  ALPRAZolam 0.25 milliGRAM(s) Oral every 8 hours PRN nerves  aluminum hydroxide/magnesium hydroxide/simethicone Suspension 30 milliLiter(s) Oral every 4 hours PRN Dyspepsia  melatonin 3 milliGRAM(s) Oral at bedtime PRN Insomnia  ondansetron Injectable 4 milliGRAM(s) IV Push every 8 hours PRN Nausea and/or Vomiting        PHYSICAL EXAM:    GENERAL: NAD, well-groomed, well-developed  NERVOUS SYSTEM:  Alert & Oriented X3, Motor Strength 5/5 B/L upper and lower extremities; DTRs 2+ intact and symmetric  CHEST/LUNG: Clear to auscultation bilaterally; No rales, rhonchi, wheezing, or rubs  HEART: Regular rate and rhythm; No murmurs, rubs, or gallops  ABDOMEN: Soft, Nontender, Nondistended; Bowel sounds present  EXTREMITIES:  2+ Peripheral Pulses, No clubbing, cyanosis, or edema    LABS:                                   7.5    17.90 )-----------( 413      ( 28 Jul 2023 05:57 )             21.8   07-28    122<L>  |  92<L>  |  16  ----------------------------<  92  4.2   |  23  |  1.12    Ca    8.3<L>      28 Jul 2023 05:57  Phos  2.7     07-26  Mg     2.3     07-26    TPro  6.2  /  Alb  2.4<L>  /  TBili  0.3  /  DBili  x   /  AST  13<L>  /  ALT  10<L>  /  AlkPhos  92  07-26      Color: x / Appearance: x / SG: x / pH: x  Gluc: 92 mg/dL / Ketone: x  / Bili: x / Urobili: x   Blood: x / Protein: x / Nitrite: x   Leuk Esterase: x / RBC: x / WBC x   Sq Epi: x / Non Sq Epi: x / Bacteria: x            * Normocytic anemia, low Iron  HH dropped she is orthostatic   PRBC 1 unit    * HypoNa  persistent  Nephrology f/up; pt is on NaCl tb, were listed as home meds but she was not actually on it, she is been on low Na diet at rehab    * s/p recent TAVR  bnp 8K    case d/w daughter     CHIEF COMPLAINT/INTERVAL HISTORY:    Patient is a 89y old  Female who presents with a chief complaint of anemia, hyponatremia (27 Jul 2023 13:59)      HPI:  88 y/o Female with PMH of Afib (s/p PPM), TAVR (On ASA and Plavix), HTN, HLD, GERD, Anxiety, Former Smoker presents to  ED BIBA for abnormal lab value. Reports she was sent in from Rehab for low hemoglobin ( 7.3 to 6.7). States she has been increasingly tired this past week. Admits to associated dizziness and intermittent headaches.    Pt is also orthostatic;  lower; it was 9.2 on July 12th      Vital Signs Last 24 Hrs  T(C): 37.3 (28 Jul 2023 08:00), Max: 37.3 (28 Jul 2023 08:00)  T(F): 99.1 (28 Jul 2023 08:00), Max: 99.1 (28 Jul 2023 08:00)  HR: 86 (28 Jul 2023 08:00) (80 - 86)  BP: 123/45 (28 Jul 2023 08:00) (104/53 - 138/63)  BP(mean): 66 (28 Jul 2023 08:00) (66 - 66)  RR: 20 (28 Jul 2023 08:00) (18 - 20)  SpO2: 95% (28 Jul 2023 08:00) (94% - 99%)    Parameters below as of 28 Jul 2023 05:42  Patient On (Oxygen Delivery Method): room air          MEDICATIONS  (STANDING):  aspirin enteric coated 81 milliGRAM(s) Oral daily  atorvastatin 10 milliGRAM(s) Oral at bedtime  clopidogrel Tablet 75 milliGRAM(s) Oral daily  digoxin     Tablet 125 MICROGram(s) Oral daily  escitalopram 10 milliGRAM(s) Oral daily  heparin   Injectable 5000 Unit(s) SubCutaneous every 12 hours  latanoprost 0.005% Ophthalmic Solution 1 Drop(s) Both EYES at bedtime  levothyroxine 75 MICROGram(s) Oral daily  metoprolol tartrate 25 milliGRAM(s) Oral two times a day  polyethylene glycol 3350 17 Gram(s) Oral daily  ranolazine 500 milliGRAM(s) Oral two times a day  sodium chloride 1 Gram(s) Oral three times a day  sodium zirconium cyclosilicate 10 Gram(s) Oral daily    MEDICATIONS  (PRN):  acetaminophen     Tablet .. 650 milliGRAM(s) Oral every 6 hours PRN Temp greater or equal to 38C (100.4F), Mild Pain (1 - 3)  ALPRAZolam 0.25 milliGRAM(s) Oral every 8 hours PRN nerves  aluminum hydroxide/magnesium hydroxide/simethicone Suspension 30 milliLiter(s) Oral every 4 hours PRN Dyspepsia  melatonin 3 milliGRAM(s) Oral at bedtime PRN Insomnia  ondansetron Injectable 4 milliGRAM(s) IV Push every 8 hours PRN Nausea and/or Vomiting        PHYSICAL EXAM:    GENERAL: NAD, well-groomed, well-developed  NERVOUS SYSTEM:  Alert & Oriented X3, Motor Strength 5/5 B/L upper and lower extremities; DTRs 2+ intact and symmetric  CHEST/LUNG: Clear to auscultation bilaterally; No rales, rhonchi, wheezing, or rubs  HEART: Regular rate and rhythm; No murmurs, rubs, or gallops  ABDOMEN: Soft, Nontender, Nondistended; Bowel sounds present  EXTREMITIES:  2+ Peripheral Pulses, No clubbing, cyanosis, or edema    LABS:                                   7.5    17.90 )-----------( 413      ( 28 Jul 2023 05:57 )             21.8   07-28    122<L>  |  92<L>  |  16  ----------------------------<  92  4.2   |  23  |  1.12    Ca    8.3<L>      28 Jul 2023 05:57  Phos  2.7     07-26  Mg     2.3     07-26    TPro  6.2  /  Alb  2.4<L>  /  TBili  0.3  /  DBili  x   /  AST  13<L>  /  ALT  10<L>  /  AlkPhos  92  07-26      Color: x / Appearance: x / SG: x / pH: x  Gluc: 92 mg/dL / Ketone: x  / Bili: x / Urobili: x   Blood: x / Protein: x / Nitrite: x   Leuk Esterase: x / RBC: x / WBC x   Sq Epi: x / Non Sq Epi: x / Bacteria: x            * Normocytic anemia, low Iron  HH dropped she is orthostatic   PRBC 1 unit    * HypoNa  persistent  Nephrology f/up; pt is on NaCl tb, were listed as home meds but she was not actually on it, she is been on low Na diet at rehab    * s/p recent TAVR  bnp 8K    case d/w daughter    Of note she was on BID Lokelma i continued daily, renal to reeval for need

## 2023-07-28 NOTE — PROGRESS NOTE ADULT - ASSESSMENT
89 yo woman with hx of A fib, HTN, s/p TAVR on 6/5 and recent episode of Syncope leading to PPM placement.  Started on Lasix post TAVR without prior hx of CHF.  No known hx of anemia pre TAVR--will obtain lab from PMD--Dr DiValentino--[phone].    --Hyponatremia with recent start of Lasix and new low salt diet leading to poor food intake.     Will hold Lasix for now since no overt signs of CHF.     Urine studies of no diagnostic utility due to diuretics.--check in 2 days post d/c lasix     Hold off on hypertonic solution since pt's mental status stable.     Continue po NACL tabs for now.  --Anemia ; unclear etiology.  Check stool, Iron study and Haptoglobin.     Follow up with PMD as  above to see if prior hx of work up. 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  Started on Lasix post TAVR without prior hx of CHF.  No known hx of anemia pre TAVR--will obtain lab from PMD--Dr DiValentino--[phone].    --Hyponatremia with recent start of Lasix and new low salt diet leading to poor food intake.     Will hold Lasix for now since no overt signs of CHF.     Urine studies of no diagnostic utility due to diuretics.--check in 2 days post d/c lasix     Hold off on hypertonic solution since pt's mental status stable.     Continue po NACL tabs for now.  --Anemia ; unclear etiology.  Check stool, Iron study and Haptoglobin.     Follow up with PMD as  above to see if prior hx of work up.    7/28 MK   - hyponatremia likely depletional     cw po nacl tab tid     will send off urine studies in am etc   - anemia fu  work-up  - hx of hyperkalemia at rehab: will fu off [facility]

## 2023-07-28 NOTE — PROGRESS NOTE ADULT - SUBJECTIVE AND OBJECTIVE BOX
NEPHROLOGY INTERVAL HPI/OVERNIGHT EVENTS:  07-28-23 @ 13:39    HPI:  89 yo woman with PMHX of A fib, HTN and started on  Lasix 20 mg daily since TAVR ( 6/5/2023) , sent to ED for low HGB.  Hx of recent fall on 7/2, with LOC, sustained grade 2 ruptured spleen and PPM placed on 7/5 and sent to [facility] rehab.  Reports weakness and abdominal discomfort for one week.  Since at rehab, has been eating  very poorly due to being give "no salt diet".  Had been on no restrictions prior to rehab.  Cannot recall when she was started on Lasix.  Med rec with NACL tabs for 3 days with unclear hx.   No previous hx of "kidney problems " per pt.  Per pt's daughter, no hx of CHF, started on Lasix post TAVR for fluid around heart"  No hx of anemia prior to recent hospitalization at [facility] post fall.    PMHX and PSHX.  --A FIB  --Syncope ( 7/2/2023)  --PPM ( on 7/5/2023)  --HTN  --TAVR ( 6/5/2023 at [place])    MEDICATIONS  (STANDING):  aspirin enteric coated 81 milliGRAM(s) Oral daily  atorvastatin 10 milliGRAM(s) Oral at bedtime  clopidogrel Tablet 75 milliGRAM(s) Oral daily  digoxin     Tablet 125 MICROGram(s) Oral daily  escitalopram 10 milliGRAM(s) Oral daily  heparin   Injectable 5000 Unit(s) SubCutaneous every 12 hours  latanoprost 0.005% Ophthalmic Solution 1 Drop(s) Both EYES at bedtime  levothyroxine 75 MICROGram(s) Oral daily  metoprolol tartrate 25 milliGRAM(s) Oral two times a day  polyethylene glycol 3350 17 Gram(s) Oral daily  ranolazine 500 milliGRAM(s) Oral two times a day  sodium chloride 1 Gram(s) Oral three times a day  sodium zirconium cyclosilicate 10 Gram(s) Oral daily    MEDICATIONS  (PRN):  acetaminophen     Tablet .. 650 milliGRAM(s) Oral every 6 hours PRN Temp greater or equal to 38C (100.4F), Mild Pain (1 - 3)  ALPRAZolam 0.25 milliGRAM(s) Oral every 8 hours PRN nerves  aluminum hydroxide/magnesium hydroxide/simethicone Suspension 30 milliLiter(s) Oral every 4 hours PRN Dyspepsia  melatonin 3 milliGRAM(s) Oral at bedtime PRN Insomnia  ondansetron Injectable 4 milliGRAM(s) IV Push every 8 hours PRN Nausea and/or Vomiting      Allergies    No Known Allergies    Intolerances        I&O's Detail        Vital Signs Last 24 Hrs  T(C): 37.3 (28 Jul 2023 08:00), Max: 37.3 (28 Jul 2023 08:00)  T(F): 99.1 (28 Jul 2023 08:00), Max: 99.1 (28 Jul 2023 08:00)  HR: 86 (28 Jul 2023 08:00) (80 - 86)  BP: 123/45 (28 Jul 2023 08:00) (104/53 - 138/63)  BP(mean): 66 (28 Jul 2023 08:00) (66 - 66)  RR: 20 (28 Jul 2023 08:00) (18 - 20)  SpO2: 95% (28 Jul 2023 08:00) (94% - 99%)    Parameters below as of 28 Jul 2023 05:42  Patient On (Oxygen Delivery Method): room air      Daily     Daily     PHYSICAL EXAM:  General: alert. awake Ox3  HEENT: MMM  CV: s1s2 rrr  LUNGS: B/L CTA  EXT: no edema    LABS:                        7.5    17.90 )-----------( 413      ( 28 Jul 2023 05:57 )             21.8     07-28    122<L>  |  92<L>  |  16  ----------------------------<  92  4.2   |  23  |  1.12    Ca    8.3<L>      28 Jul 2023 05:57  Phos  2.7     07-26  Mg     2.3     07-26    TPro  6.2  /  Alb  2.4<L>  /  TBili  0.3  /  DBili  x   /  AST  13<L>  /  ALT  10<L>  /  AlkPhos  92  07-26    PT/INR - ( 26 Jul 2023 17:23 )   PT: 11.3 sec;   INR: 1.00 ratio         PTT - ( 26 Jul 2023 17:23 )  PTT:30.4 sec  Urinalysis Basic - ( 28 Jul 2023 05:57 )    Color: x / Appearance: x / SG: x / pH: x  Gluc: 92 mg/dL / Ketone: x  / Bili: x / Urobili: x   Blood: x / Protein: x / Nitrite: x   Leuk Esterase: x / RBC: x / WBC x   Sq Epi: x / Non Sq Epi: x / Bacteria: x           NEPHROLOGY INTERVAL HPI/OVERNIGHT EVENTS:  07-28-23 @ 13:39    7/28 dtr at bedside, pt with dyspepsia and is usually on protonix as outpt  tolerating po,. med list clarified by dr victor, not on nacl tab at rehab.  has hx of chf post b/L TKR   was on lasix during that time, cannot recall if chronically required it.  dr calixto cardiology   HPI:  88 yo woman with PMHX of A fib, HTN and started on  Lasix 20 mg daily since TAVR ( 6/5/2023) , sent to ED for low HGB.  Hx of recent fall on 7/2, with LOC, sustained grade 2 ruptured spleen and PPM placed on 7/5 and sent to [facility] rehab.  Reports weakness and abdominal discomfort for one week.  Since at rehab, has been eating  very poorly due to being give "no salt diet".  Had been on no restrictions prior to rehab.  Cannot recall when she was started on Lasix.  Med rec with NACL tabs for 3 days with unclear hx.   No previous hx of "kidney problems " per pt.  Per pt's daughter, no hx of CHF, started on Lasix post TAVR for fluid around heart"  No hx of anemia prior to recent hospitalization at [facility] post fall.    PMHX and PSHX.  --A FIB  --Syncope ( 7/2/2023)  --PPM ( on 7/5/2023)  --HTN  --TAVR ( 6/5/2023 at [facility])    MEDICATIONS  (STANDING):  aspirin enteric coated 81 milliGRAM(s) Oral daily  atorvastatin 10 milliGRAM(s) Oral at bedtime  clopidogrel Tablet 75 milliGRAM(s) Oral daily  digoxin     Tablet 125 MICROGram(s) Oral daily  escitalopram 10 milliGRAM(s) Oral daily  heparin   Injectable 5000 Unit(s) SubCutaneous every 12 hours  latanoprost 0.005% Ophthalmic Solution 1 Drop(s) Both EYES at bedtime  levothyroxine 75 MICROGram(s) Oral daily  metoprolol tartrate 25 milliGRAM(s) Oral two times a day  polyethylene glycol 3350 17 Gram(s) Oral daily  ranolazine 500 milliGRAM(s) Oral two times a day  sodium chloride 1 Gram(s) Oral three times a day  sodium zirconium cyclosilicate 10 Gram(s) Oral daily    MEDICATIONS  (PRN):  acetaminophen     Tablet .. 650 milliGRAM(s) Oral every 6 hours PRN Temp greater or equal to 38C (100.4F), Mild Pain (1 - 3)  ALPRAZolam 0.25 milliGRAM(s) Oral every 8 hours PRN nerves  aluminum hydroxide/magnesium hydroxide/simethicone Suspension 30 milliLiter(s) Oral every 4 hours PRN Dyspepsia  melatonin 3 milliGRAM(s) Oral at bedtime PRN Insomnia  ondansetron Injectable 4 milliGRAM(s) IV Push every 8 hours PRN Nausea and/or Vomiting      Allergies    No Known Allergies    Intolerances        I&O's Detail        Vital Signs Last 24 Hrs  T(C): 37.3 (28 Jul 2023 08:00), Max: 37.3 (28 Jul 2023 08:00)  T(F): 99.1 (28 Jul 2023 08:00), Max: 99.1 (28 Jul 2023 08:00)  HR: 86 (28 Jul 2023 08:00) (80 - 86)  BP: 123/45 (28 Jul 2023 08:00) (104/53 - 138/63)  BP(mean): 66 (28 Jul 2023 08:00) (66 - 66)  RR: 20 (28 Jul 2023 08:00) (18 - 20)  SpO2: 95% (28 Jul 2023 08:00) (94% - 99%)    Parameters below as of 28 Jul 2023 05:42  Patient On (Oxygen Delivery Method): room air      Daily     Daily     PHYSICAL EXAM:  General: alert. awake Ox3  HEENT: MMM  CV: s1s2 rrr  LUNGS: B/L CTA  EXT: no edema    LABS:                        7.5    17.90 )-----------( 413      ( 28 Jul 2023 05:57 )             21.8     07-28    122<L>  |  92<L>  |  16  ----------------------------<  92  4.2   |  23  |  1.12    Ca    8.3<L>      28 Jul 2023 05:57  Phos  2.7     07-26  Mg     2.3     07-26    TPro  6.2  /  Alb  2.4<L>  /  TBili  0.3  /  DBili  x   /  AST  13<L>  /  ALT  10<L>  /  AlkPhos  92  07-26    PT/INR - ( 26 Jul 2023 17:23 )   PT: 11.3 sec;   INR: 1.00 ratio         PTT - ( 26 Jul 2023 17:23 )  PTT:30.4 sec  Urinalysis Basic - ( 28 Jul 2023 05:57 )    Color: x / Appearance: x / SG: x / pH: x  Gluc: 92 mg/dL / Ketone: x  / Bili: x / Urobili: x   Blood: x / Protein: x / Nitrite: x   Leuk Esterase: x / RBC: x / WBC x   Sq Epi: x / Non Sq Epi: x / Bacteria: x

## 2023-07-29 LAB
ANION GAP SERPL CALC-SCNC: 7 MMOL/L — SIGNIFICANT CHANGE UP (ref 5–17)
BUN SERPL-MCNC: 14 MG/DL — SIGNIFICANT CHANGE UP (ref 7–23)
CALCIUM SERPL-MCNC: 7.8 MG/DL — LOW (ref 8.5–10.1)
CHLORIDE SERPL-SCNC: 95 MMOL/L — LOW (ref 96–108)
CO2 SERPL-SCNC: 24 MMOL/L — SIGNIFICANT CHANGE UP (ref 22–31)
CREAT SERPL-MCNC: 0.95 MG/DL — SIGNIFICANT CHANGE UP (ref 0.5–1.3)
EGFR: 57 ML/MIN/1.73M2 — LOW
GLUCOSE SERPL-MCNC: 88 MG/DL — SIGNIFICANT CHANGE UP (ref 70–99)
HCT VFR BLD CALC: 24.9 % — LOW (ref 34.5–45)
HGB BLD-MCNC: 8.6 G/DL — LOW (ref 11.5–15.5)
MCHC RBC-ENTMCNC: 30.1 PG — SIGNIFICANT CHANGE UP (ref 27–34)
MCHC RBC-ENTMCNC: 34.5 GM/DL — SIGNIFICANT CHANGE UP (ref 32–36)
MCV RBC AUTO: 87.1 FL — SIGNIFICANT CHANGE UP (ref 80–100)
PLATELET # BLD AUTO: 390 K/UL — SIGNIFICANT CHANGE UP (ref 150–400)
POTASSIUM SERPL-MCNC: 4.5 MMOL/L — SIGNIFICANT CHANGE UP (ref 3.5–5.3)
POTASSIUM SERPL-SCNC: 4.5 MMOL/L — SIGNIFICANT CHANGE UP (ref 3.5–5.3)
RBC # BLD: 2.86 M/UL — LOW (ref 3.8–5.2)
RBC # FLD: 14.1 % — SIGNIFICANT CHANGE UP (ref 10.3–14.5)
SODIUM SERPL-SCNC: 126 MMOL/L — LOW (ref 135–145)
WBC # BLD: 13.94 K/UL — HIGH (ref 3.8–10.5)
WBC # FLD AUTO: 13.94 K/UL — HIGH (ref 3.8–10.5)

## 2023-07-29 PROCEDURE — 99232 SBSQ HOSP IP/OBS MODERATE 35: CPT

## 2023-07-29 PROCEDURE — 99233 SBSQ HOSP IP/OBS HIGH 50: CPT

## 2023-07-29 RX ADMIN — PANTOPRAZOLE SODIUM 40 MILLIGRAM(S): 20 TABLET, DELAYED RELEASE ORAL at 06:18

## 2023-07-29 RX ADMIN — LATANOPROST 1 DROP(S): 0.05 SOLUTION/ DROPS OPHTHALMIC; TOPICAL at 20:48

## 2023-07-29 RX ADMIN — Medication 81 MILLIGRAM(S): at 10:37

## 2023-07-29 RX ADMIN — Medication 125 MICROGRAM(S): at 10:35

## 2023-07-29 RX ADMIN — Medication 30 MILLILITER(S): at 19:49

## 2023-07-29 RX ADMIN — SODIUM CHLORIDE 1 GRAM(S): 9 INJECTION INTRAMUSCULAR; INTRAVENOUS; SUBCUTANEOUS at 13:53

## 2023-07-29 RX ADMIN — RANOLAZINE 500 MILLIGRAM(S): 500 TABLET, FILM COATED, EXTENDED RELEASE ORAL at 20:55

## 2023-07-29 RX ADMIN — ATORVASTATIN CALCIUM 10 MILLIGRAM(S): 80 TABLET, FILM COATED ORAL at 20:55

## 2023-07-29 RX ADMIN — Medication 75 MICROGRAM(S): at 06:18

## 2023-07-29 RX ADMIN — Medication 650 MILLIGRAM(S): at 19:49

## 2023-07-29 RX ADMIN — Medication 25 MILLIGRAM(S): at 20:55

## 2023-07-29 RX ADMIN — POLYETHYLENE GLYCOL 3350 17 GRAM(S): 17 POWDER, FOR SOLUTION ORAL at 10:36

## 2023-07-29 RX ADMIN — HEPARIN SODIUM 5000 UNIT(S): 5000 INJECTION INTRAVENOUS; SUBCUTANEOUS at 20:54

## 2023-07-29 RX ADMIN — SODIUM CHLORIDE 1 GRAM(S): 9 INJECTION INTRAMUSCULAR; INTRAVENOUS; SUBCUTANEOUS at 20:55

## 2023-07-29 RX ADMIN — Medication 3 MILLIGRAM(S): at 20:55

## 2023-07-29 RX ADMIN — RANOLAZINE 500 MILLIGRAM(S): 500 TABLET, FILM COATED, EXTENDED RELEASE ORAL at 10:35

## 2023-07-29 RX ADMIN — ESCITALOPRAM OXALATE 10 MILLIGRAM(S): 10 TABLET, FILM COATED ORAL at 10:35

## 2023-07-29 RX ADMIN — SODIUM CHLORIDE 1 GRAM(S): 9 INJECTION INTRAMUSCULAR; INTRAVENOUS; SUBCUTANEOUS at 06:19

## 2023-07-29 RX ADMIN — Medication 25 MILLIGRAM(S): at 10:35

## 2023-07-29 RX ADMIN — CLOPIDOGREL BISULFATE 75 MILLIGRAM(S): 75 TABLET, FILM COATED ORAL at 10:35

## 2023-07-29 RX ADMIN — HEPARIN SODIUM 5000 UNIT(S): 5000 INJECTION INTRAVENOUS; SUBCUTANEOUS at 10:35

## 2023-07-29 NOTE — PROGRESS NOTE ADULT - ASSESSMENT
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  Started on Lasix post TAVR without prior hx of CHF.  No known hx of anemia pre TAVR--will obtain lab from PMD--Dr DiValentino--[phone].    --Hyponatremia with recent start of Lasix and new low salt diet leading to poor food intake.     Will hold Lasix for now since no overt signs of CHF.     Urine studies of no diagnostic utility due to diuretics.--check in 2 days post d/c lasix     Hold off on hypertonic solution since pt's mental status stable.     Continue po NACL tabs for now.  --Anemia ; unclear etiology.  Check stool, Iron study and Haptoglobin.     Follow up with PMD as  above to see if prior hx of work up.    7/28 MK   - hyponatremia likely depletional     cw po nacl tab tid     will send off urine studies in am etc   - anemia fu  work-up  - hx of hyperkalemia at rehab: will fu off lokelma     7/29 MK   - hyponatremia: depletional    cw nacl tabs tid  - hx of hyperkalemia: stable k

## 2023-07-29 NOTE — PROGRESS NOTE ADULT - SUBJECTIVE AND OBJECTIVE BOX
NEPHROLOGY INTERVAL HPI/OVERNIGHT EVENTS:  23 @ 11:05     much improved no sob    dtr at bedside, pt with dyspepsia and is usually on protonix as outpt  tolerating po,. med list clarified by dr ivctor, not on nacl tab at rehab.  has hx of chf post b/L TKR   was on lasix during that time, cannot recall if chronically required it.  dr calixto cardiology   HPI:  89 yo woman with PMHX of A fib, HTN and started on  Lasix 20 mg daily since TAVR ( 2023) , sent to ED for low HGB.  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  Reports weakness and abdominal discomfort for one week.  Since at rehab, has been eating  very poorly due to being give "no salt diet".  Had been on no restrictions prior to rehab.  Cannot recall when she was started on Lasix.  Med rec with NACL tabs for 3 days with unclear hx.   No previous hx of "kidney problems " per pt.  Per pt's daughter, no hx of CHF, started on Lasix post TAVR for fluid around heart"  No hx of anemia prior to recent hospitalization at [facility] post fall.    PMHX and PSHX.  --A FIB  --Syncope ( 2023)  --PPM ( on 2023)  --HTN  --TAVR ( 2023 at [place])    MEDICATIONS  (STANDING):  aspirin enteric coated 81 milliGRAM(s) Oral daily  atorvastatin 10 milliGRAM(s) Oral at bedtime  clopidogrel Tablet 75 milliGRAM(s) Oral daily  digoxin     Tablet 125 MICROGram(s) Oral daily  escitalopram 10 milliGRAM(s) Oral daily  heparin   Injectable 5000 Unit(s) SubCutaneous every 12 hours  latanoprost 0.005% Ophthalmic Solution 1 Drop(s) Both EYES at bedtime  levothyroxine 75 MICROGram(s) Oral daily  metoprolol tartrate 25 milliGRAM(s) Oral two times a day  pantoprazole    Tablet 40 milliGRAM(s) Oral before breakfast  polyethylene glycol 3350 17 Gram(s) Oral daily  ranolazine 500 milliGRAM(s) Oral two times a day  sodium chloride 1 Gram(s) Oral three times a day    MEDICATIONS  (PRN):  acetaminophen     Tablet .. 650 milliGRAM(s) Oral every 6 hours PRN Temp greater or equal to 38C (100.4F), Mild Pain (1 - 3)  ALPRAZolam 0.25 milliGRAM(s) Oral every 8 hours PRN nerves  aluminum hydroxide/magnesium hydroxide/simethicone Suspension 30 milliLiter(s) Oral every 4 hours PRN Dyspepsia  melatonin 3 milliGRAM(s) Oral at bedtime PRN Insomnia  ondansetron Injectable 4 milliGRAM(s) IV Push every 8 hours PRN Nausea and/or Vomiting      Allergies    No Known Allergies    Intolerances        I&O's Detail    2023 07:01  -  2023 07:00  --------------------------------------------------------  IN:    PRBCs (Packed Red Blood Cells): 332 mL  Total IN: 332 mL    OUT:  Total OUT: 0 mL    Total NET: 332 mL          Vital Signs Last 24 Hrs  T(C): 36.7 (2023 07:47), Max: 37.2 (2023 16:23)  T(F): 98 (2023 07:47), Max: 98.9 (2023 16:23)  HR: 74 (2023 07:47) (74 - 80)  BP: 128/56 (2023 07:47) (128/56 - 148/69)  BP(mean): --  RR: 18 (2023 07:47) (18 - 18)  SpO2: 94% (2023 07:47) (94% - 99%)    Parameters below as of 2023 07:47  Patient On (Oxygen Delivery Method): room air      Daily     Daily Weight in k.4 (2023 07:42)    PHYSICAL EXAM:  General: alert. awake NAD  HEENT: MMM  CV: s1s2 rrr  LUNGS: B/L CTA  EXT: no edema    LABS:                        8.6    13.94 )-----------( 390      ( 2023 07:26 )             24.9     07-29    126<L>  |  95<L>  |  14  ----------------------------<  88  4.5   |  24  |  0.95    Ca    7.8<L>      2023 07:26        Urinalysis Basic - ( 2023 07:26 )    Color: x / Appearance: x / SG: x / pH: x  Gluc: 88 mg/dL / Ketone: x  / Bili: x / Urobili: x   Blood: x / Protein: x / Nitrite: x   Leuk Esterase: x / RBC: x / WBC x   Sq Epi: x / Non Sq Epi: x / Bacteria: x

## 2023-07-29 NOTE — PROGRESS NOTE ADULT - SUBJECTIVE AND OBJECTIVE BOX
CHIEF COMPLAINT/INTERVAL HISTORY:    Patient is a 89y old  Female who presents with a chief complaint of anemia, hyponatremia (27 Jul 2023 13:59)      HPI:  88 y/o Female with PMH of Afib (s/p PPM), TAVR (On ASA and Plavix), HTN, HLD, GERD, Anxiety, Former Smoker presents to  ED BIBA for abnormal lab value. Reports she was sent in from Rehab for low hemoglobin ( 7.3 to 6.7). States she has been increasingly tired this past week. Admits to associated dizziness and intermittent headaches.    Pt is also orthostatic;  lower; it was 9.2 on July 12th      Vital Signs Last 24 Hrs  T(C): 36.8 (28 Jul 2023 19:05), Max: 37.3 (28 Jul 2023 08:00)  T(F): 98.3 (28 Jul 2023 19:05), Max: 99.1 (28 Jul 2023 08:00)  HR: 78 (28 Jul 2023 19:05) (76 - 86)  BP: 129/51 (28 Jul 2023 19:05) (123/45 - 148/69)  BP(mean): 66 (28 Jul 2023 08:00) (66 - 66)  RR: 18 (28 Jul 2023 19:05) (18 - 20)  SpO2: 99% (28 Jul 2023 19:05) (95% - 99%)    Parameters below as of 28 Jul 2023 19:05  Patient On (Oxygen Delivery Method): room air            MEDICATIONS  (STANDING):  aspirin enteric coated 81 milliGRAM(s) Oral daily  atorvastatin 10 milliGRAM(s) Oral at bedtime  clopidogrel Tablet 75 milliGRAM(s) Oral daily  digoxin     Tablet 125 MICROGram(s) Oral daily  escitalopram 10 milliGRAM(s) Oral daily  heparin   Injectable 5000 Unit(s) SubCutaneous every 12 hours  latanoprost 0.005% Ophthalmic Solution 1 Drop(s) Both EYES at bedtime  levothyroxine 75 MICROGram(s) Oral daily  metoprolol tartrate 25 milliGRAM(s) Oral two times a day  polyethylene glycol 3350 17 Gram(s) Oral daily  ranolazine 500 milliGRAM(s) Oral two times a day  sodium chloride 1 Gram(s) Oral three times a day  sodium zirconium cyclosilicate 10 Gram(s) Oral daily    MEDICATIONS  (PRN):  acetaminophen     Tablet .. 650 milliGRAM(s) Oral every 6 hours PRN Temp greater or equal to 38C (100.4F), Mild Pain (1 - 3)  ALPRAZolam 0.25 milliGRAM(s) Oral every 8 hours PRN nerves  aluminum hydroxide/magnesium hydroxide/simethicone Suspension 30 milliLiter(s) Oral every 4 hours PRN Dyspepsia  melatonin 3 milliGRAM(s) Oral at bedtime PRN Insomnia  ondansetron Injectable 4 milliGRAM(s) IV Push every 8 hours PRN Nausea and/or Vomiting        PHYSICAL EXAM:    GENERAL: NAD, well-groomed, well-developed  NERVOUS SYSTEM:  Alert & Oriented X3, Motor Strength 5/5 B/L upper and lower extremities; DTRs 2+ intact and symmetric  CHEST/LUNG: Clear to auscultation bilaterally; No rales, rhonchi, wheezing, or rubs  HEART: Regular rate and rhythm; No murmurs, rubs, or gallops  ABDOMEN: Soft, Nontender, Nondistended; Bowel sounds present  EXTREMITIES:  2+ Peripheral Pulses, No clubbing, cyanosis, or edema    LABS:                                  * Normocytic anemia, low Iron  HH dropped she is orthostatic   s/p PRBC 1 unit    * HypoNa  persistent  Nephrology f/up; pt is on NaCl tb, were listed as home meds but she was not actually on it, she is been on low Na diet at rehab    * s/p recent TAVR  bnp 8K    case d/w daughter    Of note she was on BID Lokelma it was dc   CHIEF COMPLAINT/INTERVAL HISTORY:    Patient is a 89y old  Female who presents with a chief complaint of anemia, hyponatremia (27 Jul 2023 13:59)      HPI:  89 y/o Female with PMH of Afib (s/p PPM), TAVR (On ASA and Plavix), HTN, HLD, GERD, Anxiety, Former Smoker presents to  ED BIBA for abnormal lab value. Reports she was sent in from Rehab for low hemoglobin ( 7.3 to 6.7). States she has been increasingly tired this past week. Admits to associated dizziness and intermittent headaches.  s/p prbc, feels better Na improved      Vital Signs Last 24 Hrs  T(C): 36.8 (28 Jul 2023 19:05), Max: 37.3 (28 Jul 2023 08:00)  T(F): 98.3 (28 Jul 2023 19:05), Max: 99.1 (28 Jul 2023 08:00)  HR: 78 (28 Jul 2023 19:05) (76 - 86)  BP: 129/51 (28 Jul 2023 19:05) (123/45 - 148/69)  BP(mean): 66 (28 Jul 2023 08:00) (66 - 66)  RR: 18 (28 Jul 2023 19:05) (18 - 20)  SpO2: 99% (28 Jul 2023 19:05) (95% - 99%)    Parameters below as of 28 Jul 2023 19:05  Patient On (Oxygen Delivery Method): room air            MEDICATIONS  (STANDING):  aspirin enteric coated 81 milliGRAM(s) Oral daily  atorvastatin 10 milliGRAM(s) Oral at bedtime  clopidogrel Tablet 75 milliGRAM(s) Oral daily  digoxin     Tablet 125 MICROGram(s) Oral daily  escitalopram 10 milliGRAM(s) Oral daily  heparin   Injectable 5000 Unit(s) SubCutaneous every 12 hours  latanoprost 0.005% Ophthalmic Solution 1 Drop(s) Both EYES at bedtime  levothyroxine 75 MICROGram(s) Oral daily  metoprolol tartrate 25 milliGRAM(s) Oral two times a day  polyethylene glycol 3350 17 Gram(s) Oral daily  ranolazine 500 milliGRAM(s) Oral two times a day  sodium chloride 1 Gram(s) Oral three times a day  sodium zirconium cyclosilicate 10 Gram(s) Oral daily    MEDICATIONS  (PRN):  acetaminophen     Tablet .. 650 milliGRAM(s) Oral every 6 hours PRN Temp greater or equal to 38C (100.4F), Mild Pain (1 - 3)  ALPRAZolam 0.25 milliGRAM(s) Oral every 8 hours PRN nerves  aluminum hydroxide/magnesium hydroxide/simethicone Suspension 30 milliLiter(s) Oral every 4 hours PRN Dyspepsia  melatonin 3 milliGRAM(s) Oral at bedtime PRN Insomnia  ondansetron Injectable 4 milliGRAM(s) IV Push every 8 hours PRN Nausea and/or Vomiting        PHYSICAL EXAM:    GENERAL: NAD, well-groomed, well-developed  NERVOUS SYSTEM:  Alert & Oriented X3, Motor Strength 5/5 B/L upper and lower extremities; DTRs 2+ intact and symmetric  CHEST/LUNG: Clear to auscultation bilaterally; No rales, rhonchi, wheezing, or rubs  HEART: Regular rate and rhythm; No murmurs, rubs, or gallops  ABDOMEN: Soft, Nontender, Nondistended; Bowel sounds present  EXTREMITIES:  2+ Peripheral Pulses, No clubbing, cyanosis, or edema    LABS:                                              8.6    13.94 )-----------( 390      ( 29 Jul 2023 07:26 )             24.9   07-29    126<L>  |  95<L>  |  14  ----------------------------<  88  4.5   |  24  |  0.95    Ca    7.8<L>      29 Jul 2023 07:26              * Normocytic anemia, low Iron  HH dropped she is orthostatic   s/p PRBC 1 unit- HH improved    * HypoNa  improving      * s/p recent TAVR  bnp 8K    case d/w daughter    Of note she was on BID Lokelma it was dc   CHIEF COMPLAINT/INTERVAL HISTORY:    Patient is a 89y old  Female who presents with a chief complaint of anemia, hyponatremia (27 Jul 2023 13:59)      HPI:  89 y/o Female with PMH of Afib (s/p PPM), TAVR (On ASA and Plavix), HTN, HLD, GERD, Anxiety, Former Smoker presents to  ED BIBA for abnormal lab value. Reports she was sent in from Rehab for low hemoglobin ( 7.3 to 6.7). States she has been increasingly tired this past week. Admits to associated dizziness and intermittent headaches.  s/p prbc, feels better Na improved      Vital Signs Last 24 Hrs  T(C): 36.8 (28 Jul 2023 19:05), Max: 37.3 (28 Jul 2023 08:00)  T(F): 98.3 (28 Jul 2023 19:05), Max: 99.1 (28 Jul 2023 08:00)  HR: 78 (28 Jul 2023 19:05) (76 - 86)  BP: 129/51 (28 Jul 2023 19:05) (123/45 - 148/69)  BP(mean): 66 (28 Jul 2023 08:00) (66 - 66)  RR: 18 (28 Jul 2023 19:05) (18 - 20)  SpO2: 99% (28 Jul 2023 19:05) (95% - 99%)    Parameters below as of 28 Jul 2023 19:05  Patient On (Oxygen Delivery Method): room air            MEDICATIONS  (STANDING):  aspirin enteric coated 81 milliGRAM(s) Oral daily  atorvastatin 10 milliGRAM(s) Oral at bedtime  clopidogrel Tablet 75 milliGRAM(s) Oral daily  digoxin     Tablet 125 MICROGram(s) Oral daily  escitalopram 10 milliGRAM(s) Oral daily  heparin   Injectable 5000 Unit(s) SubCutaneous every 12 hours  latanoprost 0.005% Ophthalmic Solution 1 Drop(s) Both EYES at bedtime  levothyroxine 75 MICROGram(s) Oral daily  metoprolol tartrate 25 milliGRAM(s) Oral two times a day  polyethylene glycol 3350 17 Gram(s) Oral daily  ranolazine 500 milliGRAM(s) Oral two times a day  sodium chloride 1 Gram(s) Oral three times a day  sodium zirconium cyclosilicate 10 Gram(s) Oral daily    MEDICATIONS  (PRN):  acetaminophen     Tablet .. 650 milliGRAM(s) Oral every 6 hours PRN Temp greater or equal to 38C (100.4F), Mild Pain (1 - 3)  ALPRAZolam 0.25 milliGRAM(s) Oral every 8 hours PRN nerves  aluminum hydroxide/magnesium hydroxide/simethicone Suspension 30 milliLiter(s) Oral every 4 hours PRN Dyspepsia  melatonin 3 milliGRAM(s) Oral at bedtime PRN Insomnia  ondansetron Injectable 4 milliGRAM(s) IV Push every 8 hours PRN Nausea and/or Vomiting        PHYSICAL EXAM:    GENERAL: NAD, well-groomed, well-developed  NERVOUS SYSTEM:  Alert & Oriented X3, Motor Strength 5/5 B/L upper and lower extremities; DTRs 2+ intact and symmetric  CHEST/LUNG: Clear to auscultation bilaterally; No rales, rhonchi, wheezing, or rubs  HEART: Regular rate and rhythm; No murmurs, rubs, or gallops  ABDOMEN: Soft, Nontender, Nondistended; Bowel sounds present  EXTREMITIES:  2+ Peripheral Pulses, No clubbing, cyanosis, or edema    LABS:                                              8.6    13.94 )-----------( 390      ( 29 Jul 2023 07:26 )             24.9   07-29    126<L>  |  95<L>  |  14  ----------------------------<  88  4.5   |  24  |  0.95    Ca    7.8<L>      29 Jul 2023 07:26              * Normocytic anemia, low Iron  HH dropped she is orthostatic   s/p PRBC 1 unit- HH improved    * PAF  came in not on AC    * HypoNa  improving      * s/p recent TAVR  bnp 8K    case d/w daughter    Of note she was on BID Lokelma it was dc   CHIEF COMPLAINT/INTERVAL HISTORY:    Patient is a 89y old  Female who presents with a chief complaint of anemia, hyponatremia (27 Jul 2023 13:59)      HPI:  89 y/o Female with PMH of Afib (s/p PPM), TAVR (On ASA and Plavix), HTN, HLD, GERD, Anxiety, Former Smoker presents to  ED BIBA for abnormal lab value. Reports she was sent in from Rehab for low hemoglobin ( 7.3 to 6.7). States she has been increasingly tired this past week. Admits to associated dizziness and intermittent headaches.  s/p prbc, feels better Na improved      Vital Signs Last 24 Hrs  T(C): 36.8 (28 Jul 2023 19:05), Max: 37.3 (28 Jul 2023 08:00)  T(F): 98.3 (28 Jul 2023 19:05), Max: 99.1 (28 Jul 2023 08:00)  HR: 78 (28 Jul 2023 19:05) (76 - 86)  BP: 129/51 (28 Jul 2023 19:05) (123/45 - 148/69)  BP(mean): 66 (28 Jul 2023 08:00) (66 - 66)  RR: 18 (28 Jul 2023 19:05) (18 - 20)  SpO2: 99% (28 Jul 2023 19:05) (95% - 99%)    Parameters below as of 28 Jul 2023 19:05  Patient On (Oxygen Delivery Method): room air            MEDICATIONS  (STANDING):  aspirin enteric coated 81 milliGRAM(s) Oral daily  atorvastatin 10 milliGRAM(s) Oral at bedtime  clopidogrel Tablet 75 milliGRAM(s) Oral daily  digoxin     Tablet 125 MICROGram(s) Oral daily  escitalopram 10 milliGRAM(s) Oral daily  heparin   Injectable 5000 Unit(s) SubCutaneous every 12 hours  latanoprost 0.005% Ophthalmic Solution 1 Drop(s) Both EYES at bedtime  levothyroxine 75 MICROGram(s) Oral daily  metoprolol tartrate 25 milliGRAM(s) Oral two times a day  polyethylene glycol 3350 17 Gram(s) Oral daily  ranolazine 500 milliGRAM(s) Oral two times a day  sodium chloride 1 Gram(s) Oral three times a day  sodium zirconium cyclosilicate 10 Gram(s) Oral daily    MEDICATIONS  (PRN):  acetaminophen     Tablet .. 650 milliGRAM(s) Oral every 6 hours PRN Temp greater or equal to 38C (100.4F), Mild Pain (1 - 3)  ALPRAZolam 0.25 milliGRAM(s) Oral every 8 hours PRN nerves  aluminum hydroxide/magnesium hydroxide/simethicone Suspension 30 milliLiter(s) Oral every 4 hours PRN Dyspepsia  melatonin 3 milliGRAM(s) Oral at bedtime PRN Insomnia  ondansetron Injectable 4 milliGRAM(s) IV Push every 8 hours PRN Nausea and/or Vomiting        PHYSICAL EXAM:    GENERAL: NAD, well-groomed, well-developed  NERVOUS SYSTEM:  Alert & Oriented X3, Motor Strength 5/5 B/L upper and lower extremities; DTRs 2+ intact and symmetric  CHEST/LUNG: Clear to auscultation bilaterally; No rales, rhonchi, wheezing, or rubs  HEART: Regular rate and rhythm; No murmurs, rubs, or gallops  ABDOMEN: Soft, Nontender, Nondistended; Bowel sounds present  EXTREMITIES:  2+ Peripheral Pulses, No clubbing, cyanosis, or edema    LABS:                                              8.6    13.94 )-----------( 390      ( 29 Jul 2023 07:26 )             24.9   07-29    126<L>  |  95<L>  |  14  ----------------------------<  88  4.5   |  24  |  0.95    Ca    7.8<L>      29 Jul 2023 07:26              * Normocytic anemia, low Iron  HH dropped she is orthostatic   s/p PRBC 1 unit- HH improved    * PAF  came in not on AC- used to be on it then changed to ASA and Plavix  case d/w daughter  dc ASA Plavix start Eliquis  interrogate PPM    * HypoNa  improving      * s/p recent TAVR  bnp 8K  repeat BNP    case d/w daughter    Of note she was on BID Lokelma it was dc

## 2023-07-30 LAB
-  AMIKACIN: SIGNIFICANT CHANGE UP
-  AMOXICILLIN/CLAVULANIC ACID: SIGNIFICANT CHANGE UP
-  AMPICILLIN/SULBACTAM: SIGNIFICANT CHANGE UP
-  AMPICILLIN: SIGNIFICANT CHANGE UP
-  AZTREONAM: SIGNIFICANT CHANGE UP
-  CEFAZOLIN: SIGNIFICANT CHANGE UP
-  CEFEPIME: SIGNIFICANT CHANGE UP
-  CEFOXITIN: SIGNIFICANT CHANGE UP
-  CEFTRIAXONE: SIGNIFICANT CHANGE UP
-  CEFUROXIME: SIGNIFICANT CHANGE UP
-  CIPROFLOXACIN: SIGNIFICANT CHANGE UP
-  ERTAPENEM: SIGNIFICANT CHANGE UP
-  GENTAMICIN: SIGNIFICANT CHANGE UP
-  IMIPENEM: SIGNIFICANT CHANGE UP
-  LEVOFLOXACIN: SIGNIFICANT CHANGE UP
-  MEROPENEM: SIGNIFICANT CHANGE UP
-  NITROFURANTOIN: SIGNIFICANT CHANGE UP
-  PIPERACILLIN/TAZOBACTAM: SIGNIFICANT CHANGE UP
-  TOBRAMYCIN: SIGNIFICANT CHANGE UP
-  TRIMETHOPRIM/SULFAMETHOXAZOLE: SIGNIFICANT CHANGE UP
ADD ON TEST-SPECIMEN IN LAB: SIGNIFICANT CHANGE UP
ANION GAP SERPL CALC-SCNC: 6 MMOL/L — SIGNIFICANT CHANGE UP (ref 5–17)
BUN SERPL-MCNC: 16 MG/DL — SIGNIFICANT CHANGE UP (ref 7–23)
CALCIUM SERPL-MCNC: 8.5 MG/DL — SIGNIFICANT CHANGE UP (ref 8.5–10.1)
CHLORIDE SERPL-SCNC: 96 MMOL/L — SIGNIFICANT CHANGE UP (ref 96–108)
CO2 SERPL-SCNC: 24 MMOL/L — SIGNIFICANT CHANGE UP (ref 22–31)
CREAT SERPL-MCNC: 1.07 MG/DL — SIGNIFICANT CHANGE UP (ref 0.5–1.3)
CULTURE RESULTS: SIGNIFICANT CHANGE UP
EGFR: 50 ML/MIN/1.73M2 — LOW
GLUCOSE SERPL-MCNC: 82 MG/DL — SIGNIFICANT CHANGE UP (ref 70–99)
HCT VFR BLD CALC: 25.5 % — LOW (ref 34.5–45)
HGB BLD-MCNC: 8.7 G/DL — LOW (ref 11.5–15.5)
MCHC RBC-ENTMCNC: 30 PG — SIGNIFICANT CHANGE UP (ref 27–34)
MCHC RBC-ENTMCNC: 34.1 GM/DL — SIGNIFICANT CHANGE UP (ref 32–36)
MCV RBC AUTO: 87.9 FL — SIGNIFICANT CHANGE UP (ref 80–100)
METHOD TYPE: SIGNIFICANT CHANGE UP
ORGANISM # SPEC MICROSCOPIC CNT: SIGNIFICANT CHANGE UP
ORGANISM # SPEC MICROSCOPIC CNT: SIGNIFICANT CHANGE UP
PLATELET # BLD AUTO: 382 K/UL — SIGNIFICANT CHANGE UP (ref 150–400)
POTASSIUM SERPL-MCNC: 5.2 MMOL/L — SIGNIFICANT CHANGE UP (ref 3.5–5.3)
POTASSIUM SERPL-SCNC: 5.2 MMOL/L — SIGNIFICANT CHANGE UP (ref 3.5–5.3)
RBC # BLD: 2.9 M/UL — LOW (ref 3.8–5.2)
RBC # FLD: 14 % — SIGNIFICANT CHANGE UP (ref 10.3–14.5)
SODIUM SERPL-SCNC: 126 MMOL/L — LOW (ref 135–145)
SPECIMEN SOURCE: SIGNIFICANT CHANGE UP
WBC # BLD: 9.31 K/UL — SIGNIFICANT CHANGE UP (ref 3.8–10.5)
WBC # FLD AUTO: 9.31 K/UL — SIGNIFICANT CHANGE UP (ref 3.8–10.5)

## 2023-07-30 PROCEDURE — 99232 SBSQ HOSP IP/OBS MODERATE 35: CPT

## 2023-07-30 PROCEDURE — 99233 SBSQ HOSP IP/OBS HIGH 50: CPT

## 2023-07-30 RX ORDER — APIXABAN 2.5 MG/1
5 TABLET, FILM COATED ORAL EVERY 12 HOURS
Refills: 0 | Status: DISCONTINUED | OUTPATIENT
Start: 2023-07-30 | End: 2023-08-02

## 2023-07-30 RX ADMIN — Medication 25 MILLIGRAM(S): at 20:56

## 2023-07-30 RX ADMIN — POLYETHYLENE GLYCOL 3350 17 GRAM(S): 17 POWDER, FOR SOLUTION ORAL at 10:04

## 2023-07-30 RX ADMIN — Medication 25 MILLIGRAM(S): at 10:04

## 2023-07-30 RX ADMIN — RANOLAZINE 500 MILLIGRAM(S): 500 TABLET, FILM COATED, EXTENDED RELEASE ORAL at 10:03

## 2023-07-30 RX ADMIN — APIXABAN 5 MILLIGRAM(S): 2.5 TABLET, FILM COATED ORAL at 20:57

## 2023-07-30 RX ADMIN — Medication 650 MILLIGRAM(S): at 20:55

## 2023-07-30 RX ADMIN — Medication 75 MICROGRAM(S): at 05:22

## 2023-07-30 RX ADMIN — Medication 125 MICROGRAM(S): at 10:03

## 2023-07-30 RX ADMIN — Medication 650 MILLIGRAM(S): at 22:17

## 2023-07-30 RX ADMIN — Medication 650 MILLIGRAM(S): at 14:18

## 2023-07-30 RX ADMIN — ATORVASTATIN CALCIUM 10 MILLIGRAM(S): 80 TABLET, FILM COATED ORAL at 20:55

## 2023-07-30 RX ADMIN — Medication 81 MILLIGRAM(S): at 10:03

## 2023-07-30 RX ADMIN — CLOPIDOGREL BISULFATE 75 MILLIGRAM(S): 75 TABLET, FILM COATED ORAL at 10:03

## 2023-07-30 RX ADMIN — SODIUM CHLORIDE 1 GRAM(S): 9 INJECTION INTRAMUSCULAR; INTRAVENOUS; SUBCUTANEOUS at 05:22

## 2023-07-30 RX ADMIN — RANOLAZINE 500 MILLIGRAM(S): 500 TABLET, FILM COATED, EXTENDED RELEASE ORAL at 20:56

## 2023-07-30 RX ADMIN — LATANOPROST 1 DROP(S): 0.05 SOLUTION/ DROPS OPHTHALMIC; TOPICAL at 20:57

## 2023-07-30 RX ADMIN — SODIUM CHLORIDE 1 GRAM(S): 9 INJECTION INTRAMUSCULAR; INTRAVENOUS; SUBCUTANEOUS at 20:55

## 2023-07-30 RX ADMIN — ESCITALOPRAM OXALATE 10 MILLIGRAM(S): 10 TABLET, FILM COATED ORAL at 10:04

## 2023-07-30 RX ADMIN — HEPARIN SODIUM 5000 UNIT(S): 5000 INJECTION INTRAVENOUS; SUBCUTANEOUS at 10:03

## 2023-07-30 RX ADMIN — Medication 3 MILLIGRAM(S): at 20:56

## 2023-07-30 RX ADMIN — SODIUM CHLORIDE 1 GRAM(S): 9 INJECTION INTRAMUSCULAR; INTRAVENOUS; SUBCUTANEOUS at 14:19

## 2023-07-30 RX ADMIN — PANTOPRAZOLE SODIUM 40 MILLIGRAM(S): 20 TABLET, DELAYED RELEASE ORAL at 06:08

## 2023-07-30 NOTE — PROGRESS NOTE ADULT - ASSESSMENT
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  Started on Lasix post TAVR without prior hx of CHF.  No known hx of anemia pre TAVR--will obtain lab from PMD--Dr DiValentino--[phone].    --Hyponatremia with recent start of Lasix and new low salt diet leading to poor food intake.     Will hold Lasix for now since no overt signs of CHF.     Urine studies of no diagnostic utility due to diuretics.--check in 2 days post d/c lasix     Hold off on hypertonic solution since pt's mental status stable.     Continue po NACL tabs for now.  --Anemia ; unclear etiology.  Check stool, Iron study and Haptoglobin.     Follow up with PMD as  above to see if prior hx of work up.    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  Started on Lasix post TAVR without prior hx of CHF.  No known hx of anemia pre TAVR--will obtain lab from PMD--Dr DiValentino--[phone].    --Hyponatremia with recent start of Lasix and new low salt diet leading to poor food intake.     Will hold Lasix for now since no overt signs of CHF.     Urine studies of no diagnostic utility due to diuretics.--check in 2 days post d/c lasix     Hold off on hypertonic solution since pt's mental status stable.     Continue po NACL tabs for now.  --Anemia ; unclear etiology.  Check stool, Iron study and Haptoglobin.     Follow up with PMD as  above to see if prior hx of work up.    7/28 MK   - hyponatremia likely depletional     cw po nacl tab tid     will send off urine studies in am etc   - anemia fu  work-up  - hx of hyperkalemia at rehab: will fu off lokelma     7/29 MK   - hyponatremia: depletional    cw nacl tabs tid  - hx of hyperkalemia: stable k    7/30 MK   - hyponatremia depletional now with leveling off the na     will plan to repeat urine/serum studies and re-evaluate if shift in etiology     cw nacl tabs     for now no FR  dw family at bedside

## 2023-07-30 NOTE — PROGRESS NOTE ADULT - SUBJECTIVE AND OBJECTIVE BOX
NEPHROLOGY INTERVAL HPI/OVERNIGHT EVENTS:  23 @ 15:06        MEDICATIONS  (STANDING):  apixaban 5 milliGRAM(s) Oral every 12 hours  atorvastatin 10 milliGRAM(s) Oral at bedtime  digoxin     Tablet 125 MICROGram(s) Oral daily  escitalopram 10 milliGRAM(s) Oral daily  latanoprost 0.005% Ophthalmic Solution 1 Drop(s) Both EYES at bedtime  levothyroxine 75 MICROGram(s) Oral daily  metoprolol tartrate 25 milliGRAM(s) Oral two times a day  pantoprazole    Tablet 40 milliGRAM(s) Oral before breakfast  polyethylene glycol 3350 17 Gram(s) Oral daily  ranolazine 500 milliGRAM(s) Oral two times a day  sodium chloride 1 Gram(s) Oral three times a day    MEDICATIONS  (PRN):  acetaminophen     Tablet .. 650 milliGRAM(s) Oral every 6 hours PRN Temp greater or equal to 38C (100.4F), Mild Pain (1 - 3)  ALPRAZolam 0.25 milliGRAM(s) Oral every 8 hours PRN nerves  aluminum hydroxide/magnesium hydroxide/simethicone Suspension 30 milliLiter(s) Oral every 4 hours PRN Dyspepsia  melatonin 3 milliGRAM(s) Oral at bedtime PRN Insomnia  ondansetron Injectable 4 milliGRAM(s) IV Push every 8 hours PRN Nausea and/or Vomiting      Allergies    No Known Allergies    Intolerances        I&O's Detail      .    Patient was seen and evaluated on dialysis.   Patient is tolerating the procedure well.   Continue dialysis:   Dialyzer:          QB:        QD:   Goal UF ___ over ___ Hours       Vital Signs Last 24 Hrs  T(C): 36.6 (2023 07:57), Max: 36.8 (2023 20:15)  T(F): 97.9 (2023 07:57), Max: 98.3 (2023 20:15)  HR: 85 (2023 07:57) (75 - 85)  BP: 124/56 (2023 07:57) (124/56 - 149/70)  BP(mean): --  RR: 18 (2023 07:57) (18 - 18)  SpO2: 95% (2023 07:57) (95% - 98%)    Parameters below as of 2023 07:57  Patient On (Oxygen Delivery Method): room air      Daily     Daily Weight in k.7 (2023 06:37)    PHYSICAL EXAM:  General: alert. awake Ox3  HEENT: MMM  CV: s1s2 rrr  LUNGS: B/L CTA  EXT: no edema    LABS:                        8.7    9.31  )-----------( 382      ( 2023 06:57 )             25.5     07-29    126<L>  |  95<L>  |  14  ----------------------------<  88  4.5   |  24  |  0.95    Ca    7.8<L>      2023 07:26        Urinalysis Basic - ( 2023 07:26 )    Color: x / Appearance: x / SG: x / pH: x  Gluc: 88 mg/dL / Ketone: x  / Bili: x / Urobili: x   Blood: x / Protein: x / Nitrite: x   Leuk Esterase: x / RBC: x / WBC x   Sq Epi: x / Non Sq Epi: x / Bacteria: x           NEPHROLOGY INTERVAL HPI/OVERNIGHT EVENTS:  23 @ 15:06     doing well, no new c/o family at bedside with ques about switch to eliquis, deterred to dr victor   pt and family concerned about pre-emptive dc    much improved no sob    dtr at bedside, pt with dyspepsia and is usually on protonix as outpt  tolerating po,. med list clarified by dr victor, not on nacl tab at rehab.  has hx of chf post b/L TKR   was on lasix during that time, cannot recall if chronically required it.  dr calixto cardiology   HPI:  89 yo woman with PMHX of A fib, HTN and started on  Lasix 20 mg daily since TAVR ( 2023) , sent to ED for low HGB.  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  Reports weakness and abdominal discomfort for one week.  Since at rehab, has been eating  very poorly due to being give "no salt diet".  Had been on no restrictions prior to rehab.  Cannot recall when she was started on Lasix.  Med rec with NACL tabs for 3 days with unclear hx.   No previous hx of "kidney problems " per pt.  Per pt's daughter, no hx of CHF, started on Lasix post TAVR for fluid around heart"  No hx of anemia prior to recent hospitalization at [facility] post fall.    PMHX and PSHX.  --A FIB  --Syncope ( 2023)  --PPM ( on 2023)  --HTN  --TAVR ( 2023 at [place])      MEDICATIONS  (STANDING):  apixaban 5 milliGRAM(s) Oral every 12 hours  atorvastatin 10 milliGRAM(s) Oral at bedtime  digoxin     Tablet 125 MICROGram(s) Oral daily  escitalopram 10 milliGRAM(s) Oral daily  latanoprost 0.005% Ophthalmic Solution 1 Drop(s) Both EYES at bedtime  levothyroxine 75 MICROGram(s) Oral daily  metoprolol tartrate 25 milliGRAM(s) Oral two times a day  pantoprazole    Tablet 40 milliGRAM(s) Oral before breakfast  polyethylene glycol 3350 17 Gram(s) Oral daily  ranolazine 500 milliGRAM(s) Oral two times a day  sodium chloride 1 Gram(s) Oral three times a day    MEDICATIONS  (PRN):  acetaminophen     Tablet .. 650 milliGRAM(s) Oral every 6 hours PRN Temp greater or equal to 38C (100.4F), Mild Pain (1 - 3)  ALPRAZolam 0.25 milliGRAM(s) Oral every 8 hours PRN nerves  aluminum hydroxide/magnesium hydroxide/simethicone Suspension 30 milliLiter(s) Oral every 4 hours PRN Dyspepsia  melatonin 3 milliGRAM(s) Oral at bedtime PRN Insomnia  ondansetron Injectable 4 milliGRAM(s) IV Push every 8 hours PRN Nausea and/or Vomiting      Allergies    No Known Allergies    Intolerances        I&O's Detail      Vital Signs Last 24 Hrs  T(C): 36.6 (2023 07:57), Max: 36.8 (2023 20:15)  T(F): 97.9 (2023 07:57), Max: 98.3 (2023 20:15)  HR: 85 (2023 07:57) (75 - 85)  BP: 124/56 (2023 07:57) (124/56 - 149/70)  BP(mean): --  RR: 18 (2023 07:57) (18 - 18)  SpO2: 95% (2023 07:57) (95% - 98%)    Parameters below as of 2023 07:57  Patient On (Oxygen Delivery Method): room air      Daily     Daily Weight in k.7 (2023 06:37)    PHYSICAL EXAM:  General: alert. awake Ox3  HEENT: MMM  CV: s1s2 rrr  LUNGS: B/L CTA  EXT: no edema    LABS:                        8.7    9.31  )-----------( 382      ( 2023 06:57 )             25.5     07-29    126<L>  |  95<L>  |  14  ----------------------------<  88  4.5   |  24  |  0.95    Ca    7.8<L>      2023 07:26        Urinalysis Basic - ( 2023 07:26 )    Color: x / Appearance: x / SG: x / pH: x  Gluc: 88 mg/dL / Ketone: x  / Bili: x / Urobili: x   Blood: x / Protein: x / Nitrite: x   Leuk Esterase: x / RBC: x / WBC x   Sq Epi: x / Non Sq Epi: x / Bacteria: x

## 2023-07-31 LAB
ADD ON TEST-SPECIMEN IN LAB: SIGNIFICANT CHANGE UP
ANION GAP SERPL CALC-SCNC: 6 MMOL/L — SIGNIFICANT CHANGE UP (ref 5–17)
APPEARANCE UR: CLEAR — SIGNIFICANT CHANGE UP
BACTERIA # UR AUTO: ABNORMAL
BILIRUB UR-MCNC: NEGATIVE — SIGNIFICANT CHANGE UP
BUN SERPL-MCNC: 18 MG/DL — SIGNIFICANT CHANGE UP (ref 7–23)
CALCIUM SERPL-MCNC: 8.1 MG/DL — LOW (ref 8.5–10.1)
CHLORIDE SERPL-SCNC: 97 MMOL/L — SIGNIFICANT CHANGE UP (ref 96–108)
CO2 SERPL-SCNC: 24 MMOL/L — SIGNIFICANT CHANGE UP (ref 22–31)
COLOR SPEC: YELLOW — SIGNIFICANT CHANGE UP
CREAT SERPL-MCNC: 1.02 MG/DL — SIGNIFICANT CHANGE UP (ref 0.5–1.3)
DIFF PNL FLD: ABNORMAL
EGFR: 53 ML/MIN/1.73M2 — LOW
EPI CELLS # UR: SIGNIFICANT CHANGE UP
GLUCOSE SERPL-MCNC: 91 MG/DL — SIGNIFICANT CHANGE UP (ref 70–99)
GLUCOSE UR QL: NEGATIVE — SIGNIFICANT CHANGE UP
HCT VFR BLD CALC: 25.5 % — LOW (ref 34.5–45)
HGB BLD-MCNC: 8.7 G/DL — LOW (ref 11.5–15.5)
KETONES UR-MCNC: NEGATIVE — SIGNIFICANT CHANGE UP
LEUKOCYTE ESTERASE UR-ACNC: ABNORMAL
MCHC RBC-ENTMCNC: 30.2 PG — SIGNIFICANT CHANGE UP (ref 27–34)
MCHC RBC-ENTMCNC: 34.1 GM/DL — SIGNIFICANT CHANGE UP (ref 32–36)
MCV RBC AUTO: 88.5 FL — SIGNIFICANT CHANGE UP (ref 80–100)
NITRITE UR-MCNC: POSITIVE
NT-PROBNP SERPL-SCNC: HIGH PG/ML (ref 0–450)
OSMOLALITY SERPL: 257 MOSMOL/KG — LOW (ref 280–301)
PH UR: 7 — SIGNIFICANT CHANGE UP (ref 5–8)
PLATELET # BLD AUTO: 351 K/UL — SIGNIFICANT CHANGE UP (ref 150–400)
POTASSIUM SERPL-MCNC: 4.9 MMOL/L — SIGNIFICANT CHANGE UP (ref 3.5–5.3)
POTASSIUM SERPL-SCNC: 4.9 MMOL/L — SIGNIFICANT CHANGE UP (ref 3.5–5.3)
PROT UR-MCNC: 100
RBC # BLD: 2.88 M/UL — LOW (ref 3.8–5.2)
RBC # FLD: 14.1 % — SIGNIFICANT CHANGE UP (ref 10.3–14.5)
RBC CASTS # UR COMP ASSIST: >50 /HPF (ref 0–4)
SODIUM SERPL-SCNC: 127 MMOL/L — LOW (ref 135–145)
SP GR SPEC: 1.01 — SIGNIFICANT CHANGE UP (ref 1.01–1.02)
URATE SERPL-MCNC: 3.8 MG/DL — SIGNIFICANT CHANGE UP (ref 2.5–7)
UROBILINOGEN FLD QL: NEGATIVE — SIGNIFICANT CHANGE UP
WBC # BLD: 7.65 K/UL — SIGNIFICANT CHANGE UP (ref 3.8–10.5)
WBC # FLD AUTO: 7.65 K/UL — SIGNIFICANT CHANGE UP (ref 3.8–10.5)
WBC UR QL: >50 /HPF (ref 0–5)

## 2023-07-31 PROCEDURE — 99232 SBSQ HOSP IP/OBS MODERATE 35: CPT

## 2023-07-31 PROCEDURE — 99233 SBSQ HOSP IP/OBS HIGH 50: CPT

## 2023-07-31 RX ORDER — ASPIRIN/CALCIUM CARB/MAGNESIUM 324 MG
81 TABLET ORAL DAILY
Refills: 0 | Status: DISCONTINUED | OUTPATIENT
Start: 2023-07-31 | End: 2023-08-02

## 2023-07-31 RX ADMIN — ATORVASTATIN CALCIUM 10 MILLIGRAM(S): 80 TABLET, FILM COATED ORAL at 21:25

## 2023-07-31 RX ADMIN — SODIUM CHLORIDE 1 GRAM(S): 9 INJECTION INTRAMUSCULAR; INTRAVENOUS; SUBCUTANEOUS at 05:44

## 2023-07-31 RX ADMIN — Medication 25 MILLIGRAM(S): at 11:12

## 2023-07-31 RX ADMIN — Medication 650 MILLIGRAM(S): at 22:00

## 2023-07-31 RX ADMIN — Medication 125 MICROGRAM(S): at 11:12

## 2023-07-31 RX ADMIN — APIXABAN 5 MILLIGRAM(S): 2.5 TABLET, FILM COATED ORAL at 11:11

## 2023-07-31 RX ADMIN — LATANOPROST 1 DROP(S): 0.05 SOLUTION/ DROPS OPHTHALMIC; TOPICAL at 21:25

## 2023-07-31 RX ADMIN — PANTOPRAZOLE SODIUM 40 MILLIGRAM(S): 20 TABLET, DELAYED RELEASE ORAL at 05:44

## 2023-07-31 RX ADMIN — RANOLAZINE 500 MILLIGRAM(S): 500 TABLET, FILM COATED, EXTENDED RELEASE ORAL at 21:24

## 2023-07-31 RX ADMIN — Medication 25 MILLIGRAM(S): at 21:25

## 2023-07-31 RX ADMIN — Medication 0.25 MILLIGRAM(S): at 23:49

## 2023-07-31 RX ADMIN — Medication 650 MILLIGRAM(S): at 21:24

## 2023-07-31 RX ADMIN — Medication 75 MICROGRAM(S): at 05:44

## 2023-07-31 RX ADMIN — SODIUM CHLORIDE 1 GRAM(S): 9 INJECTION INTRAMUSCULAR; INTRAVENOUS; SUBCUTANEOUS at 14:15

## 2023-07-31 RX ADMIN — Medication 3 MILLIGRAM(S): at 21:25

## 2023-07-31 RX ADMIN — RANOLAZINE 500 MILLIGRAM(S): 500 TABLET, FILM COATED, EXTENDED RELEASE ORAL at 11:12

## 2023-07-31 RX ADMIN — APIXABAN 5 MILLIGRAM(S): 2.5 TABLET, FILM COATED ORAL at 21:25

## 2023-07-31 RX ADMIN — SODIUM CHLORIDE 1 GRAM(S): 9 INJECTION INTRAMUSCULAR; INTRAVENOUS; SUBCUTANEOUS at 21:24

## 2023-07-31 RX ADMIN — ESCITALOPRAM OXALATE 10 MILLIGRAM(S): 10 TABLET, FILM COATED ORAL at 11:12

## 2023-07-31 NOTE — PROGRESS NOTE ADULT - ASSESSMENT
89 yo woman with hx of A fib, HTN, s/p TAVR on 6/5 and recent episode of Syncope leading to PPM placement.  Started on Lasix post TAVR without prior hx of CHF.  No known hx of anemia pre TAVR--will obtain lab from PMD--Dr DiValentino--[phone].    --Hyponatremia with recent start of Lasix and new low salt diet leading to poor food intake.     Will hold Lasix for now since no overt signs of CHF.     Urine studies of no diagnostic utility due to diuretics.--check in 2 days post d/c lasix     Hold off on hypertonic solution since pt's mental status stable.     Continue po NACL tabs for now.  --Anemia ; unclear etiology.  Check stool, Iron study and Haptoglobin.     Follow up with PMD as  above to see if prior hx of work up.    7/28 MK   - hyponatremia likely depletional     cw po nacl tab tid     will send off urine studies in am etc   - anemia fu  work-up  - hx of hyperkalemia at rehab: will fu off lokelma     7/29 MK   - hyponatremia: depletional    cw nacl tabs tid  - hx of hyperkalemia: stable k    7/30 MK   - hyponatremia depletional now with leveling off the na     will plan to repeat urine/serum studies and re-evaluate if shift in etiology     cw nacl tabs     for now no FR  dw family at bedside     7/31 MK   - hyponatremia, depletional responding to nacl tabs    repeat urine and serum studies   dw dr victor     Pt placed on hospital bed at this time. Call button within reach. Pt states no needs at this time.      Jossie Kenyon RN  02/05/21 [id number]

## 2023-07-31 NOTE — PROGRESS NOTE ADULT - SUBJECTIVE AND OBJECTIVE BOX
NEPHROLOGY INTERVAL HPI/OVERNIGHT EVENTS:  23 @ 13:29     doing well no new c/o   doing well, no new c/o family at bedside with ques about switch to eliquis, deterred to dr victor   pt and family concerned about pre-emptive dc    much improved no sob    dtr at bedside, pt with dyspepsia and is usually on protonix as outpt  tolerating po,. med list clarified by dr victor, not on nacl tab at rehab.  has hx of chf post b/L TKR   was on lasix during that time, cannot recall if chronically required it.  dr calixto cardiology   HPI:  88 yo woman with PMHX of A fib, HTN and started on  Lasix 20 mg daily since TAVR ( 2023) , sent to ED for low HGB.  Hx of recent fall on , with LOC, sustained grade 2 ruptured spleen and PPM placed on  and sent to [facility] rehab.  Reports weakness and abdominal discomfort for one week.  Since at rehab, has been eating  very poorly due to being give "no salt diet".  Had been on no restrictions prior to rehab.  Cannot recall when she was started on Lasix.  Med rec with NACL tabs for 3 days with unclear hx.   No previous hx of "kidney problems " per pt.  Per pt's daughter, no hx of CHF, started on Lasix post TAVR for fluid around heart"  No hx of anemia prior to recent hospitalization at [facility] post fall.    PMHX and PSHX.  --A FIB  --Syncope ( 2023)  --PPM ( on 2023)  --HTN  --TAVR ( 2023 at [place])      MEDICATIONS  (STANDING):  apixaban 5 milliGRAM(s) Oral every 12 hours  atorvastatin 10 milliGRAM(s) Oral at bedtime  digoxin     Tablet 125 MICROGram(s) Oral daily  escitalopram 10 milliGRAM(s) Oral daily  latanoprost 0.005% Ophthalmic Solution 1 Drop(s) Both EYES at bedtime  levothyroxine 75 MICROGram(s) Oral daily  metoprolol tartrate 25 milliGRAM(s) Oral two times a day  pantoprazole    Tablet 40 milliGRAM(s) Oral before breakfast  polyethylene glycol 3350 17 Gram(s) Oral daily  ranolazine 500 milliGRAM(s) Oral two times a day  sodium chloride 1 Gram(s) Oral three times a day    MEDICATIONS  (PRN):  acetaminophen     Tablet .. 650 milliGRAM(s) Oral every 6 hours PRN Temp greater or equal to 38C (100.4F), Mild Pain (1 - 3)  ALPRAZolam 0.25 milliGRAM(s) Oral every 8 hours PRN nerves  aluminum hydroxide/magnesium hydroxide/simethicone Suspension 30 milliLiter(s) Oral every 4 hours PRN Dyspepsia  melatonin 3 milliGRAM(s) Oral at bedtime PRN Insomnia  ondansetron Injectable 4 milliGRAM(s) IV Push every 8 hours PRN Nausea and/or Vomiting      Allergies    No Known Allergies    Intolerances        I&O's Detail        Vital Signs Last 24 Hrs  T(C): 36.6 (2023 08:05), Max: 36.6 (2023 08:05)  T(F): 97.8 (2023 08:05), Max: 97.8 (2023 08:05)  HR: 64 (2023 08:05) (64 - 73)  BP: 127/42 (2023 08:05) (101/51 - 127/42)  BP(mean): --  RR: 18 (2023 08:05) (16 - 18)  SpO2: 98% (2023 08:05) (98% - 100%)    Parameters below as of 2023 08:05  Patient On (Oxygen Delivery Method): room air      Daily     Daily Weight in k.5 (2023 06:17)    PHYSICAL EXAM:  General: alert. awake Ox3  HEENT: MMM  CV: s1s2 rrr  LUNGS: B/L CTA  EXT: no edema    LABS:                        8.7    7.65  )-----------( 351      ( 2023 06:58 )             25.5     07-31    127<L>  |  97  |  18  ----------------------------<  91  4.9   |  24  |  1.02    Ca    8.1<L>      2023 06:58        Urinalysis Basic - ( 2023 06:58 )    Color: x / Appearance: x / SG: x / pH: x  Gluc: 91 mg/dL / Ketone: x  / Bili: x / Urobili: x   Blood: x / Protein: x / Nitrite: x   Leuk Esterase: x / RBC: x / WBC x   Sq Epi: x / Non Sq Epi: x / Bacteria: x

## 2023-07-31 NOTE — PROGRESS NOTE ADULT - SUBJECTIVE AND OBJECTIVE BOX
CHIEF COMPLAINT/INTERVAL HISTORY:    Patient is a 89y old  Female who presents with a chief complaint of anemia, hyponatremia (27 Jul 2023 13:59)      HPI:  88 y/o Female with PMH of Afib (s/p PPM), TAVR (On ASA and Plavix), HTN, HLD, GERD, Anxiety, Former Smoker presents to  ED BIBA for abnormal lab value. Reports she was sent in from Rehab for low hemoglobin ( 7.3 to 6.7). States she has been increasingly tired this past week. Admits to associated dizziness and intermittent headaches.  s/p 1 prbc, feels better Na improved;  stable on Elquis      Vital Signs Last 24 Hrs  T(C): 36.8 (28 Jul 2023 19:05), Max: 37.3 (28 Jul 2023 08:00)  T(F): 98.3 (28 Jul 2023 19:05), Max: 99.1 (28 Jul 2023 08:00)  HR: 78 (28 Jul 2023 19:05) (76 - 86)  BP: 129/51 (28 Jul 2023 19:05) (123/45 - 148/69)  BP(mean): 66 (28 Jul 2023 08:00) (66 - 66)  RR: 18 (28 Jul 2023 19:05) (18 - 20)  SpO2: 99% (28 Jul 2023 19:05) (95% - 99%)    Parameters below as of 28 Jul 2023 19:05  Patient On (Oxygen Delivery Method): room air            MEDICATIONS  (STANDING):  aspirin enteric coated 81 milliGRAM(s) Oral daily  atorvastatin 10 milliGRAM(s) Oral at bedtime  clopidogrel Tablet 75 milliGRAM(s) Oral daily  digoxin     Tablet 125 MICROGram(s) Oral daily  escitalopram 10 milliGRAM(s) Oral daily  heparin   Injectable 5000 Unit(s) SubCutaneous every 12 hours  latanoprost 0.005% Ophthalmic Solution 1 Drop(s) Both EYES at bedtime  levothyroxine 75 MICROGram(s) Oral daily  metoprolol tartrate 25 milliGRAM(s) Oral two times a day  polyethylene glycol 3350 17 Gram(s) Oral daily  ranolazine 500 milliGRAM(s) Oral two times a day  sodium chloride 1 Gram(s) Oral three times a day  sodium zirconium cyclosilicate 10 Gram(s) Oral daily    MEDICATIONS  (PRN):  acetaminophen     Tablet .. 650 milliGRAM(s) Oral every 6 hours PRN Temp greater or equal to 38C (100.4F), Mild Pain (1 - 3)  ALPRAZolam 0.25 milliGRAM(s) Oral every 8 hours PRN nerves  aluminum hydroxide/magnesium hydroxide/simethicone Suspension 30 milliLiter(s) Oral every 4 hours PRN Dyspepsia  melatonin 3 milliGRAM(s) Oral at bedtime PRN Insomnia  ondansetron Injectable 4 milliGRAM(s) IV Push every 8 hours PRN Nausea and/or Vomiting        PHYSICAL EXAM:    GENERAL: NAD, well-groomed, well-developed  NERVOUS SYSTEM:  Alert & Oriented X3, Motor Strength 5/5 B/L upper and lower extremities; DTRs 2+ intact and symmetric  CHEST/LUNG: Clear to auscultation bilaterally; No rales, rhonchi, wheezing, or rubs  HEART: Regular rate and rhythm; No murmurs, rubs, or gallops  ABDOMEN: Soft, Nontender, Nondistended; Bowel sounds present  EXTREMITIES:  2+ Peripheral Pulses, No clubbing, cyanosis, or edema    LABS:                                              8.6    13.94 )-----------( 390      ( 29 Jul 2023 07:26 )             24.9   07-29    126<L>  |  95<L>  |  14  ----------------------------<  88  4.5   |  24  |  0.95    Ca    7.8<L>      29 Jul 2023 07:26              * Normocytic anemia, low Iron  HH dropped she is orthostatic   s/p PRBC 1 unit- HH improved    * PAF  came in not on AC- used to be on it then changed to ASA and Plavix  case d/w daughter  dc ASA Plavix start Eliquis  interrogate PPM    * HypoNa  improving      * s/p recent TAVR  bnp 8K  repeat BNP    case d/w daughter    Of note she was on BID Lokelma it was dc   CHIEF COMPLAINT/INTERVAL HISTORY:    Patient is a 89y old  Female who presents with a chief complaint of anemia, hyponatremia (27 Jul 2023 13:59)      HPI:  88 y/o Female with PMH of Afib (s/p PPM), TAVR (On ASA and Plavix), HTN, HLD, GERD, Anxiety, Former Smoker presents to  ED BIBA for abnormal lab value. Reports she was sent in from Rehab for low hemoglobin ( 7.3 to 6.7). States she has been increasingly tired this past week. Admits to associated dizziness and intermittent headaches.  s/p 1 prbc, feels better Na improved;  stable on Elquis      Vital Signs Last 24 Hrs  T(C): 36.8 (28 Jul 2023 19:05), Max: 37.3 (28 Jul 2023 08:00)  T(F): 98.3 (28 Jul 2023 19:05), Max: 99.1 (28 Jul 2023 08:00)  HR: 78 (28 Jul 2023 19:05) (76 - 86)  BP: 129/51 (28 Jul 2023 19:05) (123/45 - 148/69)  BP(mean): 66 (28 Jul 2023 08:00) (66 - 66)  RR: 18 (28 Jul 2023 19:05) (18 - 20)  SpO2: 99% (28 Jul 2023 19:05) (95% - 99%)    Parameters below as of 28 Jul 2023 19:05  Patient On (Oxygen Delivery Method): room air            MEDICATIONS  (STANDING):  aspirin enteric coated 81 milliGRAM(s) Oral daily  atorvastatin 10 milliGRAM(s) Oral at bedtime  clopidogrel Tablet 75 milliGRAM(s) Oral daily  digoxin     Tablet 125 MICROGram(s) Oral daily  escitalopram 10 milliGRAM(s) Oral daily  heparin   Injectable 5000 Unit(s) SubCutaneous every 12 hours  latanoprost 0.005% Ophthalmic Solution 1 Drop(s) Both EYES at bedtime  levothyroxine 75 MICROGram(s) Oral daily  metoprolol tartrate 25 milliGRAM(s) Oral two times a day  polyethylene glycol 3350 17 Gram(s) Oral daily  ranolazine 500 milliGRAM(s) Oral two times a day  sodium chloride 1 Gram(s) Oral three times a day  sodium zirconium cyclosilicate 10 Gram(s) Oral daily    MEDICATIONS  (PRN):  acetaminophen     Tablet .. 650 milliGRAM(s) Oral every 6 hours PRN Temp greater or equal to 38C (100.4F), Mild Pain (1 - 3)  ALPRAZolam 0.25 milliGRAM(s) Oral every 8 hours PRN nerves  aluminum hydroxide/magnesium hydroxide/simethicone Suspension 30 milliLiter(s) Oral every 4 hours PRN Dyspepsia  melatonin 3 milliGRAM(s) Oral at bedtime PRN Insomnia  ondansetron Injectable 4 milliGRAM(s) IV Push every 8 hours PRN Nausea and/or Vomiting        PHYSICAL EXAM:    GENERAL: NAD, well-groomed, well-developed  NERVOUS SYSTEM:  Alert & Oriented X3, Motor Strength 5/5 B/L upper and lower extremities; DTRs 2+ intact and symmetric  CHEST/LUNG: Clear to auscultation bilaterally; No rales, rhonchi, wheezing, or rubs  HEART: Regular rate and rhythm; No murmurs, rubs, or gallops  ABDOMEN: Soft, Nontender, Nondistended; Bowel sounds present  EXTREMITIES:  2+ Peripheral Pulses, No clubbing, cyanosis, or edema    LABS:                                              8.6    13.94 )-----------( 390      ( 29 Jul 2023 07:26 )             24.9   07-29    126<L>  |  95<L>  |  14  ----------------------------<  88  4.5   |  24  |  0.95    Ca    7.8<L>      29 Jul 2023 07:26              * Normocytic anemia, low Iron  HH dropped she is orthostatic   s/p PRBC 1 unit- HH improved    * Chronic AF and TAVR 6/2/2023  came in not on AC- used to be on it then changed to ASA and Plavix, OAC was changed to Plavix in [place] 7/2 unknown reason  case d/w daughter  dc  Plavix , c/w  Eliquis+ASA  interrogate PPM- AF  request TTE    * HypoNa  improving      * s/p recent TAVR  bnp 8K---> 10; restart Lasix when ok with Nephro  request TTE      case d/w daughter  Interventional Dr Davon [facility] office called case d/w FUNMILAYO Riddle- pt was supposed to be on ASA and Eliquis; resume this treatment and dc Plavix  Of note she was on BID Lokelma it was dc

## 2023-08-01 DIAGNOSIS — I38 ENDOCARDITIS, VALVE UNSPECIFIED: ICD-10-CM

## 2023-08-01 DIAGNOSIS — I48.91 UNSPECIFIED ATRIAL FIBRILLATION: ICD-10-CM

## 2023-08-01 DIAGNOSIS — I50.32 CHRONIC DIASTOLIC (CONGESTIVE) HEART FAILURE: ICD-10-CM

## 2023-08-01 LAB
ANION GAP SERPL CALC-SCNC: 5 MMOL/L — SIGNIFICANT CHANGE UP (ref 5–17)
BUN SERPL-MCNC: 18 MG/DL — SIGNIFICANT CHANGE UP (ref 7–23)
CALCIUM SERPL-MCNC: 8 MG/DL — LOW (ref 8.5–10.1)
CHLORIDE SERPL-SCNC: 97 MMOL/L — SIGNIFICANT CHANGE UP (ref 96–108)
CO2 SERPL-SCNC: 24 MMOL/L — SIGNIFICANT CHANGE UP (ref 22–31)
CREAT SERPL-MCNC: 1.06 MG/DL — SIGNIFICANT CHANGE UP (ref 0.5–1.3)
EGFR: 50 ML/MIN/1.73M2 — LOW
GLUCOSE SERPL-MCNC: 89 MG/DL — SIGNIFICANT CHANGE UP (ref 70–99)
HCT VFR BLD CALC: 25.1 % — LOW (ref 34.5–45)
HGB BLD-MCNC: 8.6 G/DL — LOW (ref 11.5–15.5)
MCHC RBC-ENTMCNC: 30.3 PG — SIGNIFICANT CHANGE UP (ref 27–34)
MCHC RBC-ENTMCNC: 34.3 GM/DL — SIGNIFICANT CHANGE UP (ref 32–36)
MCV RBC AUTO: 88.4 FL — SIGNIFICANT CHANGE UP (ref 80–100)
OSMOLALITY UR: 391 MOSM/KG — SIGNIFICANT CHANGE UP (ref 50–1200)
PLATELET # BLD AUTO: 364 K/UL — SIGNIFICANT CHANGE UP (ref 150–400)
POTASSIUM SERPL-MCNC: 4.7 MMOL/L — SIGNIFICANT CHANGE UP (ref 3.5–5.3)
POTASSIUM SERPL-SCNC: 4.7 MMOL/L — SIGNIFICANT CHANGE UP (ref 3.5–5.3)
RBC # BLD: 2.84 M/UL — LOW (ref 3.8–5.2)
RBC # FLD: 14 % — SIGNIFICANT CHANGE UP (ref 10.3–14.5)
SODIUM SERPL-SCNC: 126 MMOL/L — LOW (ref 135–145)
SODIUM UR-SCNC: 68 MMOL/L — SIGNIFICANT CHANGE UP
WBC # BLD: 9.64 K/UL — SIGNIFICANT CHANGE UP (ref 3.8–10.5)
WBC # FLD AUTO: 9.64 K/UL — SIGNIFICANT CHANGE UP (ref 3.8–10.5)

## 2023-08-01 PROCEDURE — 99232 SBSQ HOSP IP/OBS MODERATE 35: CPT

## 2023-08-01 PROCEDURE — 71046 X-RAY EXAM CHEST 2 VIEWS: CPT | Mod: 26

## 2023-08-01 PROCEDURE — 99223 1ST HOSP IP/OBS HIGH 75: CPT

## 2023-08-01 RX ADMIN — APIXABAN 5 MILLIGRAM(S): 2.5 TABLET, FILM COATED ORAL at 20:28

## 2023-08-01 RX ADMIN — SODIUM CHLORIDE 1 GRAM(S): 9 INJECTION INTRAMUSCULAR; INTRAVENOUS; SUBCUTANEOUS at 06:20

## 2023-08-01 RX ADMIN — Medication 81 MILLIGRAM(S): at 11:08

## 2023-08-01 RX ADMIN — Medication 25 MILLIGRAM(S): at 11:09

## 2023-08-01 RX ADMIN — RANOLAZINE 500 MILLIGRAM(S): 500 TABLET, FILM COATED, EXTENDED RELEASE ORAL at 20:27

## 2023-08-01 RX ADMIN — Medication 75 MICROGRAM(S): at 06:20

## 2023-08-01 RX ADMIN — Medication 3 MILLIGRAM(S): at 20:28

## 2023-08-01 RX ADMIN — LATANOPROST 1 DROP(S): 0.05 SOLUTION/ DROPS OPHTHALMIC; TOPICAL at 20:27

## 2023-08-01 RX ADMIN — Medication 125 MICROGRAM(S): at 11:08

## 2023-08-01 RX ADMIN — ESCITALOPRAM OXALATE 10 MILLIGRAM(S): 10 TABLET, FILM COATED ORAL at 11:08

## 2023-08-01 RX ADMIN — Medication 650 MILLIGRAM(S): at 20:28

## 2023-08-01 RX ADMIN — ATORVASTATIN CALCIUM 10 MILLIGRAM(S): 80 TABLET, FILM COATED ORAL at 20:28

## 2023-08-01 RX ADMIN — SODIUM CHLORIDE 1 GRAM(S): 9 INJECTION INTRAMUSCULAR; INTRAVENOUS; SUBCUTANEOUS at 20:27

## 2023-08-01 RX ADMIN — APIXABAN 5 MILLIGRAM(S): 2.5 TABLET, FILM COATED ORAL at 11:09

## 2023-08-01 RX ADMIN — Medication 25 MILLIGRAM(S): at 20:27

## 2023-08-01 RX ADMIN — PANTOPRAZOLE SODIUM 40 MILLIGRAM(S): 20 TABLET, DELAYED RELEASE ORAL at 06:20

## 2023-08-01 RX ADMIN — RANOLAZINE 500 MILLIGRAM(S): 500 TABLET, FILM COATED, EXTENDED RELEASE ORAL at 11:09

## 2023-08-01 RX ADMIN — Medication 650 MILLIGRAM(S): at 21:00

## 2023-08-01 RX ADMIN — SODIUM CHLORIDE 1 GRAM(S): 9 INJECTION INTRAMUSCULAR; INTRAVENOUS; SUBCUTANEOUS at 16:41

## 2023-08-01 NOTE — PHYSICAL THERAPY INITIAL EVALUATION ADULT - PERTINENT HX OF CURRENT PROBLEM, REHAB EVAL
Pt admitted to  secondary to anemia and hyponatremia. Pt was at rehab PTA and s/p TAVR 6/2/2023. CT head: neg. H/H- 8.1/25.1

## 2023-08-01 NOTE — PHYSICAL THERAPY INITIAL EVALUATION ADULT - PLANNED THERAPY INTERVENTIONS, PT EVAL
Saryal, amb, transfers, bed mobility x 15'.  Pt left in bathroom to have BM. Nursing informed pt OOB in batroom. Pt instructed to not get up on her own and to utilize CB if she needs to get off of toliet. Pt with no c/o pain. Will cont to follow pt and increase as tolerated./bed mobility training/gait training/strengthening/transfer training

## 2023-08-01 NOTE — CONSULT NOTE ADULT - PROBLEM SELECTOR RECOMMENDATION 2
clinically appears compensated  though  patient elevated BNP level , which similar to her prior BNP level july 12 2023 , however minimally increased during this admission ,which is could be chronic ,  will obtain CXR.    resume lasix 20 mg po daily , metoprolol 25 mg po BID statin ,

## 2023-08-01 NOTE — PROGRESS NOTE ADULT - SUBJECTIVE AND OBJECTIVE BOX
NEPHROLOGY INTERVAL HPI/OVERNIGHT EVENTS:  23 @ 10:05  HPI:  89 y/o Female with PMH of Afib (s/p PPM), TAVR (On ASA and Plavix), HTN, HLD, GERD, Anxiety, Former Smoker presents to  ED BIBA for abnormal lab value. Reports she was sent in from Rehab for low hemoglobin ( 7.3 to 6.7). States she has been increasingly tired this past week. Admits to associated dizziness and intermittent headaches.     (2023 06:43)      Patient is a 89y old  Female who presents with a chief complaint of low blood count (01 Aug 2023 08:50)      MEDICATIONS  (STANDING):  apixaban 5 milliGRAM(s) Oral every 12 hours  aspirin  chewable 81 milliGRAM(s) Oral daily  atorvastatin 10 milliGRAM(s) Oral at bedtime  digoxin     Tablet 125 MICROGram(s) Oral daily  escitalopram 10 milliGRAM(s) Oral daily  latanoprost 0.005% Ophthalmic Solution 1 Drop(s) Both EYES at bedtime  levothyroxine 75 MICROGram(s) Oral daily  metoprolol tartrate 25 milliGRAM(s) Oral two times a day  pantoprazole    Tablet 40 milliGRAM(s) Oral before breakfast  polyethylene glycol 3350 17 Gram(s) Oral daily  ranolazine 500 milliGRAM(s) Oral two times a day  sodium chloride 1 Gram(s) Oral three times a day    MEDICATIONS  (PRN):  acetaminophen     Tablet .. 650 milliGRAM(s) Oral every 6 hours PRN Temp greater or equal to 38C (100.4F), Mild Pain (1 - 3)  ALPRAZolam 0.25 milliGRAM(s) Oral every 8 hours PRN nerves  aluminum hydroxide/magnesium hydroxide/simethicone Suspension 30 milliLiter(s) Oral every 4 hours PRN Dyspepsia  melatonin 3 milliGRAM(s) Oral at bedtime PRN Insomnia  ondansetron Injectable 4 milliGRAM(s) IV Push every 8 hours PRN Nausea and/or Vomiting      Allergies    No Known Allergies    Intolerances        I&O's Detail      .    Patient was seen and evaluated on dialysis.   Patient is tolerating the procedure well.   Continue dialysis:   Dialyzer:          QB:        QD:   Goal UF ___ over ___ Hours       Vital Signs Last 24 Hrs  T(C): 36.3 (01 Aug 2023 08:07), Max: 36.7 (2023 21:35)  T(F): 97.4 (01 Aug 2023 08:07), Max: 98.1 (2023 21:35)  HR: 63 (01 Aug 2023 08:07) (63 - 80)  BP: 132/51 (01 Aug 2023 08:07) (120/57 - 132/51)  BP(mean): --  RR: 16 (01 Aug 2023 08:07) (16 - 18)  SpO2: 96% (01 Aug 2023 08:07) (95% - 96%)    Parameters below as of 01 Aug 2023 08:07  Patient On (Oxygen Delivery Method): room air      Daily     Daily Weight in k.4 (01 Aug 2023 06:45)    PHYSICAL EXAM:  General: alert. awake Ox3  HEENT: MMM  CV: s1s2 rrr  LUNGS: B/L CTA  EXT: no edema    LABS:                        8.6    9.64  )-----------( 364      ( 01 Aug 2023 06:39 )             25.1     08-01    126<L>  |  97  |  18  ----------------------------<  89  4.7   |  24  |  1.06    Ca    8.0<L>      01 Aug 2023 06:39        Urinalysis Basic - ( 01 Aug 2023 06:39 )    Color: x / Appearance: x / SG: x / pH: x  Gluc: 89 mg/dL / Ketone: x  / Bili: x / Urobili: x   Blood: x / Protein: x / Nitrite: x   Leuk Esterase: x / RBC: x / WBC x   Sq Epi: x / Non Sq Epi: x / Bacteria: x           NEPHROLOGY INTERVAL HPI/OVERNIGHT EVENTS:  23 @ 10:05     could not sleep last night, nacl tabs are upseting her stomach + bm has been drinking alot    doing well no new c/o   doing well, no new c/o family at bedside with ques about switch to eliquis, deterred to dr victor   pt and family concerned about pre-emptive dc    much improved no sob    dtr at bedside, pt with dyspepsia and is usually on protonix as outpt  tolerating po,. med list clarified by dr victor, not on nacl tab at rehab.  has hx of chf post b/L TKR   was on lasix during that time, cannot recall if chronically required it.  dr calixto cardiology   HPI:  89 yo woman with PMHX of A fib, HTN and started on  Lasix 20 mg daily since TAVR ( 2023) , sent to ED for low HGB.  Hx of recent fall on , with LOC, sustained grade 2 ruptured spleen and PPM placed on  and sent to [facility] rehab.  Reports weakness and abdominal discomfort for one week.  Since at rehab, has been eating  very poorly due to being give "no salt diet".  Had been on no restrictions prior to rehab.  Cannot recall when she was started on Lasix.  Med rec with NACL tabs for 3 days with unclear hx.   No previous hx of "kidney problems " per pt.  Per pt's daughter, no hx of CHF, started on Lasix post TAVR for fluid around heart"  No hx of anemia prior to recent hospitalization at [facility] post fall.    PMHX and PSHX.  --A FIB  --Syncope ( 2023)  --PPM ( on 2023)  --HTN  --TAVR ( 2023 at [place])    MEDICATIONS  (STANDING):  apixaban 5 milliGRAM(s) Oral every 12 hours  aspirin  chewable 81 milliGRAM(s) Oral daily  atorvastatin 10 milliGRAM(s) Oral at bedtime  digoxin     Tablet 125 MICROGram(s) Oral daily  escitalopram 10 milliGRAM(s) Oral daily  latanoprost 0.005% Ophthalmic Solution 1 Drop(s) Both EYES at bedtime  levothyroxine 75 MICROGram(s) Oral daily  metoprolol tartrate 25 milliGRAM(s) Oral two times a day  pantoprazole    Tablet 40 milliGRAM(s) Oral before breakfast  polyethylene glycol 3350 17 Gram(s) Oral daily  ranolazine 500 milliGRAM(s) Oral two times a day  sodium chloride 1 Gram(s) Oral three times a day    MEDICATIONS  (PRN):  acetaminophen     Tablet .. 650 milliGRAM(s) Oral every 6 hours PRN Temp greater or equal to 38C (100.4F), Mild Pain (1 - 3)  ALPRAZolam 0.25 milliGRAM(s) Oral every 8 hours PRN nerves  aluminum hydroxide/magnesium hydroxide/simethicone Suspension 30 milliLiter(s) Oral every 4 hours PRN Dyspepsia  melatonin 3 milliGRAM(s) Oral at bedtime PRN Insomnia  ondansetron Injectable 4 milliGRAM(s) IV Push every 8 hours PRN Nausea and/or Vomiting      Allergies    No Known Allergies    Intolerances        I&O's Detail      .    Patient was seen and evaluated on dialysis.   Patient is tolerating the procedure well.   Continue dialysis:   Dialyzer:          QB:        QD:   Goal UF ___ over ___ Hours       Vital Signs Last 24 Hrs  T(C): 36.3 (01 Aug 2023 08:07), Max: 36.7 (2023 21:35)  T(F): 97.4 (01 Aug 2023 08:07), Max: 98.1 (2023 21:35)  HR: 63 (01 Aug 2023 08:07) (63 - 80)  BP: 132/51 (01 Aug 2023 08:07) (120/57 - 132/51)  BP(mean): --  RR: 16 (01 Aug 2023 08:07) (16 - 18)  SpO2: 96% (01 Aug 2023 08:07) (95% - 96%)    Parameters below as of 01 Aug 2023 08:07  Patient On (Oxygen Delivery Method): room air      Daily     Daily Weight in k.4 (01 Aug 2023 06:45)    PHYSICAL EXAM:  General: alert. awake Ox3  HEENT: MMM  CV: s1s2 rrr  LUNGS: B/L CTA  EXT: no edema    LABS:                        8.6    9.64  )-----------( 364      ( 01 Aug 2023 06:39 )             25.1     08-    126<L>  |  97  |  18  ----------------------------<  89  4.7   |  24  |  1.06    Ca    8.0<L>      01 Aug 2023 06:39        Urinalysis Basic - ( 01 Aug 2023 06:39 )    Color: x / Appearance: x / SG: x / pH: x  Gluc: 89 mg/dL / Ketone: x  / Bili: x / Urobili: x   Blood: x / Protein: x / Nitrite: x   Leuk Esterase: x / RBC: x / WBC x   Sq Epi: x / Non Sq Epi: x / Bacteria: x

## 2023-08-01 NOTE — PROGRESS NOTE ADULT - SUBJECTIVE AND OBJECTIVE BOX
CHIEF COMPLAINT/INTERVAL HISTORY:    Patient is a 89y old  Female who presents with a chief complaint of anemia, hyponatremia (27 Jul 2023 13:59)      HPI:  89 y/o Female with PMH of Afib (s/p PPM), TAVR (6/2023), HTN, HLD, GERD, Anxiety, Former Smoker presents to  ED BIBA for abnormal lab value. Reports she was sent in from Rehab for low hemoglobin ( 7.3 to 6.7). States she has been increasingly tired this past week. Admits to associated dizziness and intermittent headaches.    Patient had TAVR on June 2023  was released from the hospital on Eliquis and aspirin went to rehab where apparently had an episode of loss of consciousness and dizziness and was sent to [facility];  for some reason upon discharge Eliquis was replaced with Plavix.  Patient went to rehab where routine blood work indicated that she was hyponatremic and anemic and salt tablets were started the day before admission.  She was sent to the hospital the next morning for the above.  On adm her Na was 126 and she was anemic. GI eval the pt and decided no further w/up.   s/p 1 prbc since admission  was started on NaCl tb and Na slowly went up now dipped at 126.  after conversation with interventional cardiologist her medications were changed to Eliquis and aspirin.  JESUS no B lines; pt is off her outpatient Lasix and seems to have a baseline BNP of 10K      Vital Signs Last 24 Hrs  T(C): 36.3 (01 Aug 2023 08:07), Max: 36.7 (31 Jul 2023 21:35)  T(F): 97.4 (01 Aug 2023 08:07), Max: 98.1 (31 Jul 2023 21:35)  HR: 63 (01 Aug 2023 08:07) (63 - 80)  BP: 132/51 (01 Aug 2023 08:07) (120/57 - 132/51)  BP(mean): --  RR: 16 (01 Aug 2023 08:07) (16 - 18)  SpO2: 96% (01 Aug 2023 08:07) (95% - 96%)    Parameters below as of 01 Aug 2023 08:07  Patient On (Oxygen Delivery Method): room air              MEDICATIONS  (STANDING):  apixaban 5 milliGRAM(s) Oral every 12 hours  aspirin  chewable 81 milliGRAM(s) Oral daily  atorvastatin 10 milliGRAM(s) Oral at bedtime  digoxin     Tablet 125 MICROGram(s) Oral daily  escitalopram 10 milliGRAM(s) Oral daily  latanoprost 0.005% Ophthalmic Solution 1 Drop(s) Both EYES at bedtime  levothyroxine 75 MICROGram(s) Oral daily  metoprolol tartrate 25 milliGRAM(s) Oral two times a day  pantoprazole    Tablet 40 milliGRAM(s) Oral before breakfast  polyethylene glycol 3350 17 Gram(s) Oral daily  ranolazine 500 milliGRAM(s) Oral two times a day  sodium chloride 1 Gram(s) Oral three times a day        PHYSICAL EXAM:    GENERAL: NAD, well-groomed, well-developed  NERVOUS SYSTEM:  Alert & Oriented X3, Motor Strength 5/5 B/L upper and lower extremities; DTRs 2+ intact and symmetric  CHEST/LUNG: Clear to auscultation bilaterally; No rales, rhonchi, wheezing, or rubs  HEART: Regular rate and rhythm; No murmurs, rubs, or gallops  ABDOMEN: Soft, Nontender, Nondistended; Bowel sounds present  EXTREMITIES:  2+ Peripheral Pulses, No clubbing, cyanosis, or edema    LABS:                                                         8.6    9.64  )-----------( 364      ( 01 Aug 2023 06:39 )             25.1   08-01    126<L>  |  97  |  18  ----------------------------<  89  4.7   |  24  |  1.06    Ca    8.0<L>      01 Aug 2023 06:39      Tele: AF      * Normocytic anemia, low Iron  HH dropped she was orthostatic   s/p PRBC 1 unit- HH improved and orthostatics resolved; Lasix was discontinued on adm  s/p IV iron  OP w/up for Anemia  HH stable despite OAC added to antiplatelets    * Persistent hypoNa  start fluid restiction  JESUS- no B lines    * Chronic AF and TAVR 6/2/2023  came in not on AC- used to be on it then changed to ASA and Plavix, OAC was changed to Plavix in [place] 7/2 unknown reason  case d/w daughter  dc  Plavix , c/w  Eliquis+ASA  interrogate PPM- AF  TTE pending      * s/p recent TAVR  bnp 8K---> 10; restart Lasix when ok with Nephro  per cardio this is her chronic BNP      case d/w daughter  Interventional Dr Davon MCKEON office called case d/w FUNMILAYO Riddle- pt was supposed to be on ASA and Eliquis; resume this treatment and dc Plavix  Of note she was on BID Lokelma it was dc;

## 2023-08-01 NOTE — PROGRESS NOTE ADULT - ASSESSMENT
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  Started on Lasix post TAVR without prior hx of CHF.  No known hx of anemia pre TAVR--will obtain lab from PMD--Dr DiValentino--[phone].    --Hyponatremia with recent start of Lasix and new low salt diet leading to poor food intake.     Will hold Lasix for now since no overt signs of CHF.     Urine studies of no diagnostic utility due to diuretics.--check in 2 days post d/c lasix     Hold off on hypertonic solution since pt's mental status stable.     Continue po NACL tabs for now.  --Anemia ; unclear etiology.  Check stool, Iron study and Haptoglobin.     Follow up with PMD as  above to see if prior hx of work up.    7/28 MK   - hyponatremia likely depletional     cw po nacl tab tid     will send off urine studies in am etc   - anemia fu  work-up  - hx of hyperkalemia at rehab: will fu off lokelma     7/29 MK   - hyponatremia: depletional    cw nacl tabs tid  - hx of hyperkalemia: stable k    7/30 MK   - hyponatremia depletional now with leveling off the na     will plan to repeat urine/serum studies and re-evaluate if shift in etiology     cw nacl tabs     for now no FR  dw family at bedside     7/31 MK   - hyponatremia, depletional responding to nacl tabs    repeat urine and serum studies   dw dr victor        8/1 MK   - hyponatremia with varibility    will FR to 1000 ml, pt aware need to comply  - + BNP with hx of chf    will hold lasix intro for now     fu cxr     may need intermittent lasix in future    pt is aware of this   dw dr palla

## 2023-08-01 NOTE — CONSULT NOTE ADULT - SUBJECTIVE AND OBJECTIVE BOX
PCP:        CHIEF COMPLAINT:    HPI:  88 y/o Female with PMH of Afib (s/p PPM), TAVR june (On ASA and Plavix), HTN, HLD, GERD, Anxiety, Former Smoker presents to  ED BIBA for abnormal lab value. Reports she was sent in from Rehab for low hemoglobin ( 7.3 to 6.7). States she has been increasingly tired this past week. Admits to associated dizziness and intermittent headaches.  Patient was noted to have very low hemoglobin ,  patient has hx of chronic anemia ,  very low sodium level ,  showed elevated BNP level , patient says she used to have worse  SOB on activity prior to TAVR  better after that time , does sleep supine with 2 pillows , no recent CHF , patient  chest X ray no evidence of  CHF . patient was on low dose lasix as OP , however was held due to hyponatremia ,on fluid  restriction ,           PAST MEDICAL & SURGICAL HISTORY:  as above           Allergies    No Known Allergies    Intolerances        SOCIAL HISTORY:  non smoker       FAMILY HISTORY:  no significant cardiac history     MEDICATIONS:Home Medications:  acetaminophen 325 mg oral tablet: 2 tab(s) orally every 6 hours as needed for pain (26 Jul 2023 23:27)  aspirin 81 mg oral delayed release tablet: 1 tab(s) orally once a day (26 Jul 2023 23:27)  bisacodyl 10 mg rectal suppository: 1 suppository(ies) rectally prn as needed for  constipation if no relief from MOM (26 Jul 2023 23:27)  clopidogrel 75 mg oral tablet: 1 tab(s) orally once a day (26 Jul 2023 23:27)  Colace 100 mg oral capsule: 2 cap(s) orally once a day (at bedtime) (26 Jul 2023 23:27)  digoxin 125 mcg (0.125 mg) oral tablet: 1 tab(s) orally every other day (26 Jul 2023 23:27)  escitalopram 5 mg oral tablet: 2 tab(s) orally once a day (26 Jul 2023 23:27)  Fleet Enema 19 g-7 g rectal enema: 1 each rectally prn as needed for  constipation if no relief from MOM or Dulcolax (26 Jul 2023 23:27)  furosemide 20 mg oral tablet: 1 tab(s) orally once a day HOLD for SBP&lt; 110 (26 Jul 2023 23:27)  latanoprost 0.005% ophthalmic solution: 1 drop(s) in each eye once a day (at bedtime) (26 Jul 2023 23:27)  levothyroxine 75 mcg (0.075 mg) oral tablet: 1 tab(s) orally once a day (26 Jul 2023 23:27)  Lipitor 10 mg oral tablet: 1 tab(s) orally once a day (at bedtime) (26 Jul 2023 23:27)  Lokelma 10 g oral powder for reconstitution: 10 gram(s) orally every 12 hours (26 Jul 2023 23:27)  metoprolol tartrate 25 mg oral tablet: 1 tab(s) orally 2 times a day , HOLD for BP&lt;110 or Pulse&lt;60 (26 Jul 2023 23:27)  Milk of Magnesia 8% oral suspension: 30 milliliter(s) orally every 3 days as needed for  constipation (26 Jul 2023 23:27)  Pepcid 40 mg oral tablet: 1 tab(s) orally once a day (26 Jul 2023 23:27)  polyethylene glycol 3350 oral powder for reconstitution: 17 gram(s) orally once a day (26 Jul 2023 23:27)  ranolazine 500 mg oral tablet, extended release: 1 tab(s) orally 2 times a day (26 Jul 2023 23:27)  sodium chloride 1 g oral tablet: 1 tab(s) orally 3 times a day for 3 days (26 Jul 2023 23:27)  Venofer 20 mg/mL intravenous solution: 100 milligram(s) intravenously once a day for 3 days (26 Jul 2023 23:27)    MEDICATIONS  (STANDING):  apixaban 5 milliGRAM(s) Oral every 12 hours  aspirin  chewable 81 milliGRAM(s) Oral daily  atorvastatin 10 milliGRAM(s) Oral at bedtime  digoxin     Tablet 125 MICROGram(s) Oral daily  escitalopram 10 milliGRAM(s) Oral daily  latanoprost 0.005% Ophthalmic Solution 1 Drop(s) Both EYES at bedtime  levothyroxine 75 MICROGram(s) Oral daily  metoprolol tartrate 25 milliGRAM(s) Oral two times a day  pantoprazole    Tablet 40 milliGRAM(s) Oral before breakfast  polyethylene glycol 3350 17 Gram(s) Oral daily  ranolazine 500 milliGRAM(s) Oral two times a day  sodium chloride 1 Gram(s) Oral three times a day    MEDICATIONS  (PRN):  acetaminophen     Tablet .. 650 milliGRAM(s) Oral every 6 hours PRN Temp greater or equal to 38C (100.4F), Mild Pain (1 - 3)  ALPRAZolam 0.25 milliGRAM(s) Oral every 8 hours PRN nerves  aluminum hydroxide/magnesium hydroxide/simethicone Suspension 30 milliLiter(s) Oral every 4 hours PRN Dyspepsia  melatonin 3 milliGRAM(s) Oral at bedtime PRN Insomnia  ondansetron Injectable 4 milliGRAM(s) IV Push every 8 hours PRN Nausea and/or Vomiting      REVIEW OF SYSTEMS:    CONSTITUTIONAL: No weakness, fevers or chills  EYES/ENT: No visual changes;  No vertigo or throat pain   NECK: No pain or stiffness  RESPIRATORY: No cough, wheezing, hemoptysis; No shortness of breath  CARDIOVASCULAR: No chest pain or palpitations  GASTROINTESTINAL: No abdominal or epigastric pain. No nausea, vomiting, or hematemesis; No diarrhea or constipation. No melena or hematochezia.  GENITOURINARY: No dysuria, frequency or hematuria  NEUROLOGICAL: No numbness or weakness  SKIN: No itching, burning, rashes, or lesions   All other review of systems is negative unless indicated above    Vital Signs Last 24 Hrs  T(C): 36.3 (01 Aug 2023 08:07), Max: 36.7 (31 Jul 2023 21:35)  T(F): 97.4 (01 Aug 2023 08:07), Max: 98.1 (31 Jul 2023 21:35)  HR: 63 (01 Aug 2023 08:07) (63 - 80)  BP: 132/51 (01 Aug 2023 08:07) (120/57 - 132/51)  BP(mean): --  RR: 16 (01 Aug 2023 08:07) (16 - 18)  SpO2: 96% (01 Aug 2023 08:07) (95% - 96%)    Parameters below as of 01 Aug 2023 08:07  Patient On (Oxygen Delivery Method): room air        I&O's Summary      PHYSICAL EXAM:    Constitutional: NAD, awake and alert, frail   HEENT: PERR, EOMI,  No oral cyananosis.  Neck:  supple,  No JVD  Respiratory: Breath sounds are clear bilaterally, No wheezing, rales or rhonchi  Cardiovascular: S1 and S2, regular rate and rhythm, no Murmurs, gallops or rubs  Gastrointestinal: Bowel Sounds present, soft, nontender.   Extremities: No peripheral edema. No clubbing or cyanosis.  Vascular: 2+ peripheral pulses  Neurological: A/O x 3, no focal deficits  Musculoskeletal: no calf tenderness.  Skin: No rashes.      LABS: All Labs Reviewed:                        8.6    9.64  )-----------( 364      ( 01 Aug 2023 06:39 )             25.1                         8.7    7.65  )-----------( 351      ( 31 Jul 2023 06:58 )             25.5                         8.7    9.31  )-----------( 382      ( 30 Jul 2023 06:57 )             25.5     01 Aug 2023 06:39    126    |  97     |  18     ----------------------------<  89     4.7     |  24     |  1.06   31 Jul 2023 06:58    127    |  97     |  18     ----------------------------<  91     4.9     |  24     |  1.02   30 Jul 2023 06:57    126    |  96     |  16     ----------------------------<  82     5.2     |  24     |  1.07     Ca    8.0        01 Aug 2023 06:39  Ca    8.1        31 Jul 2023 06:58  Ca    8.5        30 Jul 2023 06:57    Pro-Brain Natriuretic Peptide: 21133 pg/mL Pro-Brain Natriuretic Peptide: 8936 pg/mL    July 10389 Bpro BNP (  [facility] ) july       Blood Culture:         RADIOLOGY/EKG:  < from: 12 Lead ECG (07.26.23 @ 16:27) >   Atrial fibrillation  Left axis deviation  Left bundle branchblock  Abnormal ECG  No previous ECGs available  Confirmed by REI PADILLA, WILMA ([id number]) on 7/27/2023 4:23:13 PM    < end of copied text >  < from: Xray Chest 1 View- PORTABLE-Routine (Xray Chest 1 View- PORTABLE-Routine .) (07.26.23 @ 18:00) >  IMPRESSION:   No radiographic evidence of active chest disease.    < end of copied text >

## 2023-08-02 ENCOUNTER — TRANSCRIPTION ENCOUNTER (OUTPATIENT)
Age: 88
End: 2023-08-02

## 2023-08-02 VITALS — DIASTOLIC BLOOD PRESSURE: 52 MMHG | SYSTOLIC BLOOD PRESSURE: 114 MMHG

## 2023-08-02 LAB
ANION GAP SERPL CALC-SCNC: 4 MMOL/L — LOW (ref 5–17)
BUN SERPL-MCNC: 19 MG/DL — SIGNIFICANT CHANGE UP (ref 7–23)
CALCIUM SERPL-MCNC: 8.5 MG/DL — SIGNIFICANT CHANGE UP (ref 8.5–10.1)
CHLORIDE SERPL-SCNC: 99 MMOL/L — SIGNIFICANT CHANGE UP (ref 96–108)
CO2 SERPL-SCNC: 27 MMOL/L — SIGNIFICANT CHANGE UP (ref 22–31)
CORTICOSTEROID BINDING GLOBULIN RESULT: 1.6 MG/DL — LOW
CORTIS F/TOTAL MFR SERPL: 28 % — SIGNIFICANT CHANGE UP
CORTIS SERPL-MCNC: 14 UG/DL — SIGNIFICANT CHANGE UP
CORTISOL, FREE RESULT: 3.9 UG/DL — HIGH
CREAT SERPL-MCNC: 1.08 MG/DL — SIGNIFICANT CHANGE UP (ref 0.5–1.3)
EGFR: 49 ML/MIN/1.73M2 — LOW
GLUCOSE SERPL-MCNC: 92 MG/DL — SIGNIFICANT CHANGE UP (ref 70–99)
POTASSIUM SERPL-MCNC: 4.7 MMOL/L — SIGNIFICANT CHANGE UP (ref 3.5–5.3)
POTASSIUM SERPL-SCNC: 4.7 MMOL/L — SIGNIFICANT CHANGE UP (ref 3.5–5.3)
SODIUM SERPL-SCNC: 130 MMOL/L — LOW (ref 135–145)

## 2023-08-02 PROCEDURE — 99232 SBSQ HOSP IP/OBS MODERATE 35: CPT

## 2023-08-02 PROCEDURE — 99233 SBSQ HOSP IP/OBS HIGH 50: CPT

## 2023-08-02 PROCEDURE — 99239 HOSP IP/OBS DSCHRG MGMT >30: CPT

## 2023-08-02 RX ORDER — SODIUM CHLORIDE 9 MG/ML
1 INJECTION INTRAMUSCULAR; INTRAVENOUS; SUBCUTANEOUS DAILY
Refills: 0 | Status: DISCONTINUED | OUTPATIENT
Start: 2023-08-02 | End: 2023-08-02

## 2023-08-02 RX ORDER — FUROSEMIDE 40 MG
1 TABLET ORAL
Refills: 0 | DISCHARGE

## 2023-08-02 RX ORDER — APIXABAN 2.5 MG/1
1 TABLET, FILM COATED ORAL
Qty: 60 | Refills: 0
Start: 2023-08-02 | End: 2023-08-31

## 2023-08-02 RX ORDER — DIGOXIN 250 MCG
1 TABLET ORAL
Qty: 0 | Refills: 0 | DISCHARGE
Start: 2023-08-02

## 2023-08-02 RX ORDER — FAMOTIDINE 10 MG/ML
1 INJECTION INTRAVENOUS
Refills: 0 | DISCHARGE

## 2023-08-02 RX ORDER — PANTOPRAZOLE SODIUM 20 MG/1
1 TABLET, DELAYED RELEASE ORAL
Qty: 30 | Refills: 0
Start: 2023-08-02 | End: 2023-08-31

## 2023-08-02 RX ORDER — DIGOXIN 250 MCG
1 TABLET ORAL
Refills: 0 | DISCHARGE

## 2023-08-02 RX ORDER — SODIUM CHLORIDE 9 MG/ML
1 INJECTION INTRAMUSCULAR; INTRAVENOUS; SUBCUTANEOUS
Qty: 30 | Refills: 0
Start: 2023-08-02 | End: 2023-08-31

## 2023-08-02 RX ADMIN — Medication 25 MILLIGRAM(S): at 09:35

## 2023-08-02 RX ADMIN — Medication 650 MILLIGRAM(S): at 13:42

## 2023-08-02 RX ADMIN — Medication 650 MILLIGRAM(S): at 19:34

## 2023-08-02 RX ADMIN — APIXABAN 5 MILLIGRAM(S): 2.5 TABLET, FILM COATED ORAL at 09:36

## 2023-08-02 RX ADMIN — Medication 81 MILLIGRAM(S): at 09:36

## 2023-08-02 RX ADMIN — PANTOPRAZOLE SODIUM 40 MILLIGRAM(S): 20 TABLET, DELAYED RELEASE ORAL at 05:48

## 2023-08-02 RX ADMIN — ESCITALOPRAM OXALATE 10 MILLIGRAM(S): 10 TABLET, FILM COATED ORAL at 09:36

## 2023-08-02 RX ADMIN — POLYETHYLENE GLYCOL 3350 17 GRAM(S): 17 POWDER, FOR SOLUTION ORAL at 09:39

## 2023-08-02 RX ADMIN — Medication 650 MILLIGRAM(S): at 14:10

## 2023-08-02 RX ADMIN — SODIUM CHLORIDE 1 GRAM(S): 9 INJECTION INTRAMUSCULAR; INTRAVENOUS; SUBCUTANEOUS at 05:48

## 2023-08-02 RX ADMIN — Medication 75 MICROGRAM(S): at 05:48

## 2023-08-02 RX ADMIN — Medication 125 MICROGRAM(S): at 09:36

## 2023-08-02 RX ADMIN — RANOLAZINE 500 MILLIGRAM(S): 500 TABLET, FILM COATED, EXTENDED RELEASE ORAL at 09:37

## 2023-08-02 NOTE — PROGRESS NOTE ADULT - PROBLEM SELECTOR PLAN 3
·  Problem: Atrial fibrillation.   ·  Recommendation: PPM july  , chronic anemia , continue eliquis 5 mg po BID.

## 2023-08-02 NOTE — DISCHARGE NOTE PROVIDER - NSDCHC_MEDRECSTATUS_GEN_ALL_CORE
Back pain    Depression    GERD (gastroesophageal reflux disease)    Neck fracture    Scleroderma Admission Reconciliation is Completed  Discharge Reconciliation is Completed

## 2023-08-02 NOTE — DISCHARGE NOTE NURSING/CASE MANAGEMENT/SOCIAL WORK - NSDCPEFALRISK_GEN_ALL_CORE
For information on Fall & Injury Prevention, visit: [email or web address] OR  [email or web address] OR  https://www.cdc.gov/steadi/patient.html

## 2023-08-02 NOTE — PROGRESS NOTE ADULT - SUBJECTIVE AND OBJECTIVE BOX
NEPHROLOGY INTERVAL HPI/OVERNIGHT EVENTS:    Date of Service: 23 @ 10:20    --   could not sleep last night, nacl tabs are upseting her stomach + bm has been drinking alot    doing well no new c/o   doing well, no new c/o family at bedside with ques about switch to eliquis, deterred to dr victor   pt and family concerned about pre-emptive dc    much improved no sob    dtr at bedside, pt with dyspepsia and is usually on protonix as outpt  tolerating po,. med list clarified by dr victor, not on nacl tab at rehab.  has hx of chf post b/L TKR   was on lasix during that time, cannot recall if chronically required it.  dr calixto cardiology   HPI:  88 yo woman with PMHX of A fib, HTN and started on  Lasix 20 mg daily since TAVR ( 2023) , sent to ED for low HGB.  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  Reports weakness and abdominal discomfort for one week.  Since at rehab, has been eating  very poorly due to being give "no salt diet".  Had been on no restrictions prior to rehab.  Cannot recall when she was started on Lasix.  Med rec with NACL tabs for 3 days with unclear hx.   No previous hx of "kidney problems " per pt.  Per pt's daughter, no hx of CHF, started on Lasix post TAVR for fluid around heart"  No hx of anemia prior to recent hospitalization at [facility] post fall.    PMHX and PSHX.  --A FIB  --Syncope ( 2023)  --PPM ( on 2023)  --HTN  --TAVR ( 2023 at [place])      MEDICATIONS  (STANDING):  apixaban 5 milliGRAM(s) Oral every 12 hours  aspirin  chewable 81 milliGRAM(s) Oral daily  atorvastatin 10 milliGRAM(s) Oral at bedtime  digoxin     Tablet 125 MICROGram(s) Oral daily  escitalopram 10 milliGRAM(s) Oral daily  latanoprost 0.005% Ophthalmic Solution 1 Drop(s) Both EYES at bedtime  levothyroxine 75 MICROGram(s) Oral daily  metoprolol tartrate 25 milliGRAM(s) Oral two times a day  pantoprazole    Tablet 40 milliGRAM(s) Oral before breakfast  polyethylene glycol 3350 17 Gram(s) Oral daily  ranolazine 500 milliGRAM(s) Oral two times a day  sodium chloride 1 Gram(s) Oral three times a day    MEDICATIONS  (PRN):  acetaminophen     Tablet .. 650 milliGRAM(s) Oral every 6 hours PRN Temp greater or equal to 38C (100.4F), Mild Pain (1 - 3)  ALPRAZolam 0.25 milliGRAM(s) Oral every 8 hours PRN nerves  aluminum hydroxide/magnesium hydroxide/simethicone Suspension 30 milliLiter(s) Oral every 4 hours PRN Dyspepsia  melatonin 3 milliGRAM(s) Oral at bedtime PRN Insomnia  ondansetron Injectable 4 milliGRAM(s) IV Push every 8 hours PRN Nausea and/or Vomiting    Vital Signs Last 24 Hrs  T(C): 36.8 (02 Aug 2023 09:02), Max: 36.8 (02 Aug 2023 09:02)  T(F): 98.2 (02 Aug 2023 09:02), Max: 98.2 (02 Aug 2023 09:02)  HR: 76 (02 Aug 2023 09:02) (76 - 79)  BP: 127/49 (02 Aug 2023 09:02) (127/49 - 132/51)  BP(mean): --  RR: 18 (02 Aug 2023 09:02) (18 - 18)  SpO2: 95% (02 Aug 2023 09:02) (95% - 97%)    Parameters below as of 02 Aug 2023 09:02  Patient On (Oxygen Delivery Method): room air      Daily     Daily Weight in k.3 (02 Aug 2023 05:56)      PHYSICAL EXAM:  GENERAL:   CHEST/LUNG:   HEART:   ABDOMEN:   EXTREMITIES:   SKIN:     LABS:                        8.6    9.64  )-----------( 364      ( 01 Aug 2023 06:39 )             25.1     08-02    130<L>  |  99  |  19  ----------------------------<  92  4.7   |  27  |  1.08    Ca    8.5      02 Aug 2023 06:46        Urinalysis Basic - ( 02 Aug 2023 06:46 )    Color: x / Appearance: x / SG: x / pH: x  Gluc: 92 mg/dL / Ketone: x  / Bili: x / Urobili: x   Blood: x / Protein: x / Nitrite: x   Leuk Esterase: x / RBC: x / WBC x   Sq Epi: x / Non Sq Epi: x / Bacteria: x      RADIOLOGY & ADDITIONAL TESTS:   NEPHROLOGY INTERVAL HPI/OVERNIGHT EVENTS:    Date of Service: 23 @ 10:20    --Feeling weak this morning.  Eating better?   could not sleep last night, nacl tabs are upseting her stomach + bm has been drinking alot    doing well no new c/o   doing well, no new c/o family at bedside with ques about switch to eliquis, deterred to dr victor   pt and family concerned about pre-emptive dc    much improved no sob    dtr at bedside, pt with dyspepsia and is usually on protonix as outpt  tolerating po,. med list clarified by dr victor, not on nacl tab at rehab.  has hx of chf post b/L TKR   was on lasix during that time, cannot recall if chronically required it.  dr calixto cardiology   HPI:  88 yo woman with PMHX of A fib, HTN and started on  Lasix 20 mg daily since TAVR ( 2023) , sent to ED for low HGB.  Hx of recent fall on , with LOC, sustained grade 2 ruptured spleen and PPM placed on  and sent to [facility] rehab.  Reports weakness and abdominal discomfort for one week.  Since at rehab, has been eating  very poorly due to being give "no salt diet".  Had been on no restrictions prior to rehab.  Cannot recall when she was started on Lasix.  Med rec with NACL tabs for 3 days with unclear hx.   No previous hx of "kidney problems " per pt.  Per pt's daughter, no hx of CHF, started on Lasix post TAVR for fluid around heart"  No hx of anemia prior to recent hospitalization at [facility] post fall.    PMHX and PSHX.  --A FIB  --Syncope ( 2023)  --PPM ( on 2023)  --HTN  --TAVR ( 2023 at [place])      MEDICATIONS  (STANDING):  apixaban 5 milliGRAM(s) Oral every 12 hours  aspirin  chewable 81 milliGRAM(s) Oral daily  atorvastatin 10 milliGRAM(s) Oral at bedtime  digoxin     Tablet 125 MICROGram(s) Oral daily  escitalopram 10 milliGRAM(s) Oral daily  latanoprost 0.005% Ophthalmic Solution 1 Drop(s) Both EYES at bedtime  levothyroxine 75 MICROGram(s) Oral daily  metoprolol tartrate 25 milliGRAM(s) Oral two times a day  pantoprazole    Tablet 40 milliGRAM(s) Oral before breakfast  polyethylene glycol 3350 17 Gram(s) Oral daily  ranolazine 500 milliGRAM(s) Oral two times a day  sodium chloride 1 Gram(s) Oral three times a day    MEDICATIONS  (PRN):  acetaminophen     Tablet .. 650 milliGRAM(s) Oral every 6 hours PRN Temp greater or equal to 38C (100.4F), Mild Pain (1 - 3)  ALPRAZolam 0.25 milliGRAM(s) Oral every 8 hours PRN nerves  aluminum hydroxide/magnesium hydroxide/simethicone Suspension 30 milliLiter(s) Oral every 4 hours PRN Dyspepsia  melatonin 3 milliGRAM(s) Oral at bedtime PRN Insomnia  ondansetron Injectable 4 milliGRAM(s) IV Push every 8 hours PRN Nausea and/or Vomiting    Vital Signs Last 24 Hrs  T(C): 36.8 (02 Aug 2023 09:02), Max: 36.8 (02 Aug 2023 09:02)  T(F): 98.2 (02 Aug 2023 09:02), Max: 98.2 (02 Aug 2023 09:02)  HR: 76 (02 Aug 2023 09:02) (76 - 79)  BP: 127/49 (02 Aug 2023 09:02) (127/49 - 132/51)  BP(mean): --  RR: 18 (02 Aug 2023 09:02) (18 - 18)  SpO2: 95% (02 Aug 2023 09:02) (95% - 97%)    Parameters below as of 02 Aug 2023 09:02  Patient On (Oxygen Delivery Method): room air      Daily     Daily Weight in k.3 (02 Aug 2023 05:56)      PHYSICAL EXAM:  GENERAL: No distress  CHEST/LUNG: Fair air entry  HEART: S1S2 RRR  ABDOMEN: soft  EXTREMITIES: no edema  SKIN:     LABS:                        8.6    9.64  )-----------( 364      ( 01 Aug 2023 06:39 )             25.1     08-02    130<L>  |  99  |  19  ----------------------------<  92  4.7   |  27  |  1.08    Ca    8.5      02 Aug 2023 06:46        Urinalysis Basic - ( 02 Aug 2023 06:46 )    Color: x / Appearance: x / SG: x / pH: x  Gluc: 92 mg/dL / Ketone: x  / Bili: x / Urobili: x   Blood: x / Protein: x / Nitrite: x   Leuk Esterase: x / RBC: x / WBC x   Sq Epi: x / Non Sq Epi: x / Bacteria: x      RADIOLOGY & ADDITIONAL TESTS:

## 2023-08-02 NOTE — PROGRESS NOTE ADULT - NS ATTEND OPT1 GEN_ALL_CORE
I attest my time as attending is greater than 50% of the total combined time spent on qualifying patient care activities by the PA/NP and attending.
Statement Selected

## 2023-08-02 NOTE — DISCHARGE NOTE PROVIDER - HOSPITAL COURSE
* Normocytic anemia, low Iron  HH dropped she was orthostatic   s/p PRBC 1 unit- HH improved and orthostatics resolved; Lasix was discontinued on adm  s/p IV iron  OP w/up for Anemia  HH stable despite OAC added to antiplatelets    * Persistent hypoNa  start fluid restiction  JESUS- no B lines    * Chronic AF and TAVR 6/2/2023  came in not on AC- used to be on it then changed to ASA and Plavix, OAC was changed to Plavix in [place] 7/2 unknown reason  case d/w daughter  dc  Plavix , c/w  Eliquis+ASA  interrogate PPM- AF  TTE pending      * s/p recent TAVR  bnp 8K---> 10; restart Lasix when ok with Nephro  per cardio this is her chronic BNP      case d/w daughter  Interventional Dr Davon MCKEON office called case d/w FUNMILAYO Riddle- pt was supposed to be on ASA and Eliquis; resume this treatment and dc Plavix  Of note she was on BID Lokelma it was dc;

## 2023-08-02 NOTE — PROGRESS NOTE ADULT - SUBJECTIVE AND OBJECTIVE BOX
CHIEF COMPLAINT: dizziness, headaches     HPI:  88 y/o Female with PMH of Afib (s/p PPM), TAVR june (On ASA and Plavix), HTN, HLD, GERD, Anxiety, Former Smoker presents to  ED BIBA for abnormal lab value. Reports she was sent in from Rehab for low hemoglobin ( 7.3 to 6.7). States she has been increasingly tired this past week. Admits to associated dizziness and intermittent headaches.  Patient was noted to have very low hemoglobin ,  patient has hx of chronic anemia ,  very low sodium level ,  showed elevated BNP level , patient says she used to have worse  SOB on activity prior to TAVR  better after that time , does sleep supine with 2 pillows , no recent CHF , patient  chest X ray no evidence of  CHF . patient was on low dose lasix as OP , however was held due to hyponatremia ,on fluid  restriction ,     8/2/23: feels tired, no cardiac complaints, off tele     MEDICATIONS:Home Medications:  acetaminophen 325 mg oral tablet: 2 tab(s) orally every 6 hours as needed for pain (26 Jul 2023 23:27)  aspirin 81 mg oral delayed release tablet: 1 tab(s) orally once a day (26 Jul 2023 23:27)  bisacodyl 10 mg rectal suppository: 1 suppository(ies) rectally prn as needed for  constipation if no relief from MOM (26 Jul 2023 23:27)  clopidogrel 75 mg oral tablet: 1 tab(s) orally once a day (26 Jul 2023 23:27)  Colace 100 mg oral capsule: 2 cap(s) orally once a day (at bedtime) (26 Jul 2023 23:27)  digoxin 125 mcg (0.125 mg) oral tablet: 1 tab(s) orally every other day (26 Jul 2023 23:27)  escitalopram 5 mg oral tablet: 2 tab(s) orally once a day (26 Jul 2023 23:27)  Fleet Enema 19 g-7 g rectal enema: 1 each rectally prn as needed for  constipation if no relief from MOM or Dulcolax (26 Jul 2023 23:27)  furosemide 20 mg oral tablet: 1 tab(s) orally once a day HOLD for SBP&lt; 110 (26 Jul 2023 23:27)  latanoprost 0.005% ophthalmic solution: 1 drop(s) in each eye once a day (at bedtime) (26 Jul 2023 23:27)  levothyroxine 75 mcg (0.075 mg) oral tablet: 1 tab(s) orally once a day (26 Jul 2023 23:27)  Lipitor 10 mg oral tablet: 1 tab(s) orally once a day (at bedtime) (26 Jul 2023 23:27)  Lokelma 10 g oral powder for reconstitution: 10 gram(s) orally every 12 hours (26 Jul 2023 23:27)  metoprolol tartrate 25 mg oral tablet: 1 tab(s) orally 2 times a day , HOLD for BP&lt;110 or Pulse&lt;60 (26 Jul 2023 23:27)  Milk of Magnesia 8% oral suspension: 30 milliliter(s) orally every 3 days as needed for  constipation (26 Jul 2023 23:27)  Pepcid 40 mg oral tablet: 1 tab(s) orally once a day (26 Jul 2023 23:27)  polyethylene glycol 3350 oral powder for reconstitution: 17 gram(s) orally once a day (26 Jul 2023 23:27)  ranolazine 500 mg oral tablet, extended release: 1 tab(s) orally 2 times a day (26 Jul 2023 23:27)  sodium chloride 1 g oral tablet: 1 tab(s) orally 3 times a day for 3 days (26 Jul 2023 23:27)  Venofer 20 mg/mL intravenous solution: 100 milligram(s) intravenously once a day for 3 days (26 Jul 2023 23:27)    MEDICATIONS  (STANDING):  apixaban 5 milliGRAM(s) Oral every 12 hours  aspirin  chewable 81 milliGRAM(s) Oral daily  atorvastatin 10 milliGRAM(s) Oral at bedtime  digoxin     Tablet 125 MICROGram(s) Oral daily  escitalopram 10 milliGRAM(s) Oral daily  latanoprost 0.005% Ophthalmic Solution 1 Drop(s) Both EYES at bedtime  levothyroxine 75 MICROGram(s) Oral daily  metoprolol tartrate 25 milliGRAM(s) Oral two times a day  pantoprazole    Tablet 40 milliGRAM(s) Oral before breakfast  polyethylene glycol 3350 17 Gram(s) Oral daily  ranolazine 500 milliGRAM(s) Oral two times a day  sodium chloride 1 Gram(s) Oral daily      MEDICATIONS  (PRN):  acetaminophen     Tablet .. 650 milliGRAM(s) Oral every 6 hours PRN Temp greater or equal to 38C (100.4F), Mild Pain (1 - 3)  ALPRAZolam 0.25 milliGRAM(s) Oral every 8 hours PRN nerves  aluminum hydroxide/magnesium hydroxide/simethicone Suspension 30 milliLiter(s) Oral every 4 hours PRN Dyspepsia  melatonin 3 milliGRAM(s) Oral at bedtime PRN Insomnia  ondansetron Injectable 4 milliGRAM(s) IV Push every 8 hours PRN Nausea and/or Vomiting    Vital Signs Last 24 Hrs  T(C): 36.8 (02 Aug 2023 09:02), Max: 36.8 (02 Aug 2023 09:02)  T(F): 98.2 (02 Aug 2023 09:02), Max: 98.2 (02 Aug 2023 09:02)  HR: 76 (02 Aug 2023 09:02) (76 - 79)  BP: 127/49 (02 Aug 2023 09:02) (127/49 - 132/51)  BP(mean): --  RR: 18 (02 Aug 2023 09:02) (18 - 18)  SpO2: 95% (02 Aug 2023 09:02) (95% - 97%)    Parameters below as of 02 Aug 2023 09:02  Patient On (Oxygen Delivery Method): room air    PHYSICAL EXAM:    Constitutional: NAD, awake and alert, frail   HEENT: PERR, EOMI,  No oral cyananosis.  Neck:  supple,  No JVD  Respiratory: Breath sounds are clear bilaterally, No wheezing, rales or rhonchi  Cardiovascular: S1 and S2, regular rate and rhythm, no Murmurs, gallops or rubs  Gastrointestinal: Bowel Sounds present, soft, nontender.   Extremities: No peripheral edema. No clubbing or cyanosis.  Vascular: 2+ peripheral pulses  Neurological: A/O x 3, no focal deficits  Musculoskeletal: no calf tenderness.  Skin: No rashes.      LABS: All Labs Reviewed:                        8.6    9.64  )-----------( 364      ( 01 Aug 2023 06:39 )             25.1                         8.7    7.65  )-----------( 351      ( 31 Jul 2023 06:58 )             25.5                         8.7    9.31  )-----------( 382      ( 30 Jul 2023 06:57 )             25.5     01 Aug 2023 06:39    126    |  97     |  18     ----------------------------<  89     4.7     |  24     |  1.06   31 Jul 2023 06:58    127    |  97     |  18     ----------------------------<  91     4.9     |  24     |  1.02   30 Jul 2023 06:57    126    |  96     |  16     ----------------------------<  82     5.2     |  24     |  1.07     Ca    8.0        01 Aug 2023 06:39  Ca    8.1        31 Jul 2023 06:58  Ca    8.5        30 Jul 2023 06:57    Pro-Brain Natriuretic Peptide: 56906 pg/mL Pro-Brain Natriuretic Peptide: 8936 pg/mL    July 10389 Bpro BNP (  [facility] ) july       RADIOLOGY/EKG: reviewed       < from: 12 Lead ECG (07.26.23 @ 16:27) >   Atrial fibrillation  Left axis deviation  Left bundle branchblock  Abnormal ECG  No previous ECGs available  Confirmed by REI PADILLA, WILMA ([id number]) on 7/27/2023 4:23:13 PM    < end of copied text >  < from: Xray Chest 1 View- PORTABLE-Routine (Xray Chest 1 View- PORTABLE-Routine .) (07.26.23 @ 18:00) >  IMPRESSION:   No radiographic evidence of active chest disease.    < end of copied text >

## 2023-08-02 NOTE — DISCHARGE NOTE PROVIDER - NSDCCPCAREPLAN_GEN_ALL_CORE_FT
PRINCIPAL DISCHARGE DIAGNOSIS  Diagnosis: Hyponatremia  Assessment and Plan of Treatment: Normocytic anemia, low Iron  s/p PRBC 1 unit- HH improved and orthostatics resolved; Lasix was discontinued on adm  s/p IV iron  OP w/up for Anemia  HH stable despite OAC added to antiplatelets  Persistent hypoNa  start fluid restiction  Nephrology consulted :started salt tablets  IMPORTANT  ONCE A  DAY SALT TABLETS  NEEDS BMP DAILY  FOLLOWUP WITH DR. IVY in one week  Chronic AF and TAVR 6/2/2023  Important: Cardiology consulted: D/w with outpatient cardiologist  DISCONTINUE  Plavix , ONLY continue  Eliquis+ASA  Interventional Dr Davon MCKEON office called case d/w FUNMILAYO Riddle- pt was supposed to be on ASA and Eliquis; resume this treatment and dc Plavix  NO further lokelma         PRINCIPAL DISCHARGE DIAGNOSIS  Diagnosis: Hyponatremia  Assessment and Plan of Treatment: Normocytic anemia, low Iron  s/p PRBC 1 unit- HH improved and orthostatics resolved; Lasix was discontinued on adm  s/p IV iron  OP w/up for Anemia  HH stable despite OAC added to antiplatelets  Persistent hypoNa  start fluid restiction  Nephrology consulted :started salt tablets  IMPORTANT  ONCE A  DAY SALT TABLETS  NEEDS BMP DAILY  PLEASE continue lasix 20 daily  FOLLOWUP WITH DR. IVY in one week  Chronic AF and TAVR 6/2/2023  Important: Cardiology consulted: D/w with outpatient cardiologist  DISCONTINUE  Plavix , ONLY continue  Eliquis+ASA  Interventional Dr Davon MCKEON office called case d/w FUNMILAYO Riddle- pt was supposed to be on ASA and Eliquis; resume this treatment and dc Plavix  NO further lokelma         PRINCIPAL DISCHARGE DIAGNOSIS  Diagnosis: Hyponatremia  Assessment and Plan of Treatment: Normocytic anemia, low Iron  s/p PRBC 1 unit- HH improved and orthostatics resolved; Lasix was discontinued on adm  s/p IV iron  OP w/up for Anemia  HH stable despite OAC added to antiplatelets  Persistent hypoNa  start fluid restiction  Nephrology consulted :started salt tablets  IMPORTANT  ONCE A  DAY SALT TABLETS  NEEDS BMP DAILY  PLEASE discontinue lasix 20 until seen by nephrology  FOLLOWUP WITH DR. IVY in one week  Chronic AF and TAVR 6/2/2023  Important: Cardiology consulted: D/w with outpatient cardiologist  DISCONTINUE  Plavix , ONLY continue  Eliquis+ASA  Interventional Dr Davon MCKEON office called case d/w FUNMILAYO Riddle- pt was supposed to be on ASA and Eliquis; resume this treatment and dc Plavix  NO further lokelma

## 2023-08-02 NOTE — PROGRESS NOTE ADULT - ASSESSMENT
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  Started on Lasix post TAVR without prior hx of CHF.  No known hx of anemia pre TAVR--will obtain lab from PMD--Dr DiValentino--[phone].    --Hyponatremia with recent start of Lasix and new low salt diet leading to poor food intake.     Will hold Lasix for now since no overt signs of CHF.     Urine studies of no diagnostic utility due to diuretics.--check in 2 days post d/c lasix     Hold off on hypertonic solution since pt's mental status stable.     Continue po NACL tabs for now.  --Anemia ; unclear etiology.  Check stool, Iron study and Haptoglobin.     Follow up with PMD as  above to see if prior hx of work up.    7/28 MK   - hyponatremia likely depletional     cw po nacl tab tid     will send off urine studies in am etc   - anemia fu  work-up  - hx of hyperkalemia at rehab: will fu off lokelma     7/29 MK   - hyponatremia: depletional    cw nacl tabs tid  - hx of hyperkalemia: stable k    7/30 MK   - hyponatremia depletional now with leveling off the na     will plan to repeat urine/serum studies and re-evaluate if shift in etiology     cw nacl tabs     for now no FR  dw family at bedside     7/31 MK   - hyponatremia, depletional responding to nacl tabs    repeat urine and serum studies   dw dr victor        8/1 MK   - hyponatremia with varibility    will FR to 1000 ml, pt aware need to comply  - + BNP with hx of chf    will hold lasix intro for now     fu cxr     may need intermittent lasix in future    pt is aware of this   dw dr palla     8/2 SY   88 yo woman with hx of A fib, HTN, s/p TAVR on 6/5 and recent episode of Syncope leading to PPM placement.  Started on Lasix post TAVR without prior hx of CHF.  No known hx of anemia pre TAVR--will obtain lab from PMD--Dr DiValentino--[phone].    --Hyponatremia with recent start of Lasix and new low salt diet leading to poor food intake.     Will hold Lasix for now since no overt signs of CHF.     Urine studies of no diagnostic utility due to diuretics.--check in 2 days post d/c lasix     Hold off on hypertonic solution since pt's mental status stable.     Continue po NACL tabs for now.  --Anemia ; unclear etiology.  Check stool, Iron study and Haptoglobin.     Follow up with PMD as  above to see if prior hx of work up.    7/28 MK   - hyponatremia likely depletional     cw po nacl tab tid     will send off urine studies in am etc   - anemia fu  work-up  - hx of hyperkalemia at rehab: will fu off lokelma     7/29 MK   - hyponatremia: depletional    cw nacl tabs tid  - hx of hyperkalemia: stable k    7/30 MK   - hyponatremia depletional now with leveling off the na     will plan to repeat urine/serum studies and re-evaluate if shift in etiology     cw nacl tabs     for now no FR  dw family at bedside     7/31 MK   - hyponatremia, depletional responding to nacl tabs    repeat urine and serum studies   dw dr victor        8/1 MK   - hyponatremia with varibility    will FR to 1000 ml, pt aware need to comply  - + BNP with hx of chf    will hold lasix intro for now     fu cxr     may need intermittent lasix in future    pt is aware of this   dw dr palla     8/2 SY  --Hyponatremia : initial lab data c/w depletional hyponatremia :     slowly taper off NACL to assess response.     Continue to encourage increased protein intake.  --Continue to hold diuretics for now as resp status stable.  --Anemia : for outpt work up per primary team.

## 2023-08-02 NOTE — PROGRESS NOTE ADULT - PROBLEM SELECTOR PLAN 2
·  Problem: Chronic diastolic congestive heart failure.   ·  Recommendation: clinically appears compensated  though  patient elevated BNP level , which similar to her prior BNP level july 12 2023 , however minimally increased during this admission ,which is could be chronic ,  check CXR.

## 2023-08-02 NOTE — PROGRESS NOTE ADULT - PROVIDER SPECIALTY LIST ADULT
Hospitalist
Nephrology
Hospitalist
Nephrology
Nephrology
Cardiology

## 2023-08-02 NOTE — DISCHARGE NOTE PROVIDER - CARE PROVIDER_API CALL
Yun, Suk-Hyeon  Nephrology  [facility], [address] [address]  Phone: [phone]  Fax: [phone]  Follow Up Time: 1 week

## 2023-08-02 NOTE — DISCHARGE NOTE NURSING/CASE MANAGEMENT/SOCIAL WORK - PATIENT PORTAL LINK FT
You can access the FollowMyHealth Patient Portal offered by [facility] by registering at the following website: [email or web address]. By joining [facility]’s FollowMyHealth portal, you will also be able to view your health information using other applications (apps) compatible with our system.

## 2023-08-02 NOTE — DISCHARGE NOTE PROVIDER - NSDCMRMEDTOKEN_GEN_ALL_CORE_FT
acetaminophen 325 mg oral tablet: 2 tab(s) orally every 6 hours as needed for pain  apixaban 5 mg oral tablet: 1 tab(s) orally every 12 hours  aspirin 81 mg oral delayed release tablet: 1 tab(s) orally once a day  bisacodyl 10 mg rectal suppository: 1 suppository(ies) rectally prn as needed for  constipation if no relief from MOM  clopidogrel 75 mg oral tablet: 1 tab(s) orally once a day  Colace 100 mg oral capsule: 2 cap(s) orally once a day (at bedtime)  digoxin 125 mcg (0.125 mg) oral tablet: 1 tab(s) orally once a day  escitalopram 5 mg oral tablet: 2 tab(s) orally once a day  furosemide 20 mg oral tablet: 1 tab(s) orally once a day HOLD for SBP&lt; 110  latanoprost 0.005% ophthalmic solution: 1 drop(s) in each eye once a day (at bedtime)  levothyroxine 75 mcg (0.075 mg) oral tablet: 1 tab(s) orally once a day  Lipitor 10 mg oral tablet: 1 tab(s) orally once a day (at bedtime)  Lokelma 10 g oral powder for reconstitution: 10 gram(s) orally every 12 hours  metoprolol tartrate 25 mg oral tablet: 1 tab(s) orally 2 times a day , HOLD for BP&lt;110 or Pulse&lt;60  Milk of Magnesia 8% oral suspension: 30 milliliter(s) orally every 3 days as needed for  constipation  Pepcid 40 mg oral tablet: 1 tab(s) orally once a day  polyethylene glycol 3350 oral powder for reconstitution: 17 gram(s) orally once a day  ranolazine 500 mg oral tablet, extended release: 1 tab(s) orally 2 times a day  sodium chloride 1 g oral tablet: 1 tab(s) orally 3 times a day for 3 days   acetaminophen 325 mg oral tablet: 2 tab(s) orally every 6 hours as needed for pain  apixaban 5 mg oral tablet: 1 tab(s) orally every 12 hours  aspirin 81 mg oral delayed release tablet: 1 tab(s) orally once a day  bisacodyl 10 mg rectal suppository: 1 suppository(ies) rectally prn as needed for  constipation if no relief from MOM  clopidogrel 75 mg oral tablet: 1 tab(s) orally once a day  Colace 100 mg oral capsule: 2 cap(s) orally once a day (at bedtime)  digoxin 125 mcg (0.125 mg) oral tablet: 1 tab(s) orally once a day  escitalopram 5 mg oral tablet: 2 tab(s) orally once a day  furosemide 20 mg oral tablet: 1 tab(s) orally once a day HOLD for SBP&lt; 110  latanoprost 0.005% ophthalmic solution: 1 drop(s) in each eye once a day (at bedtime)  levothyroxine 75 mcg (0.075 mg) oral tablet: 1 tab(s) orally once a day  Lipitor 10 mg oral tablet: 1 tab(s) orally once a day (at bedtime)  Lokelma 10 g oral powder for reconstitution: 10 gram(s) orally every 12 hours  metoprolol tartrate 25 mg oral tablet: 1 tab(s) orally 2 times a day , HOLD for BP&lt;110 or Pulse&lt;60  Milk of Magnesia 8% oral suspension: 30 milliliter(s) orally every 3 days as needed for  constipation  Pepcid 40 mg oral tablet: 1 tab(s) orally once a day  polyethylene glycol 3350 oral powder for reconstitution: 17 gram(s) orally once a day  ranolazine 500 mg oral tablet, extended release: 1 tab(s) orally 2 times a day  sodium chloride 1 g oral tablet: 1 tab(s) orally once a day   acetaminophen 325 mg oral tablet: 2 tab(s) orally every 6 hours as needed for pain  apixaban 5 mg oral tablet: 1 tab(s) orally every 12 hours  aspirin 81 mg oral delayed release tablet: 1 tab(s) orally once a day  bisacodyl 10 mg rectal suppository: 1 suppository(ies) rectally prn as needed for  constipation if no relief from MOM  clopidogrel 75 mg oral tablet: 1 tab(s) orally once a day  Colace 100 mg oral capsule: 2 cap(s) orally once a day (at bedtime)  digoxin 125 mcg (0.125 mg) oral tablet: 1 tab(s) orally once a day  escitalopram 5 mg oral tablet: 2 tab(s) orally once a day  latanoprost 0.005% ophthalmic solution: 1 drop(s) in each eye once a day (at bedtime)  levothyroxine 75 mcg (0.075 mg) oral tablet: 1 tab(s) orally once a day  Lipitor 10 mg oral tablet: 1 tab(s) orally once a day (at bedtime)  Lokelma 10 g oral powder for reconstitution: 10 gram(s) orally every 12 hours  metoprolol tartrate 25 mg oral tablet: 1 tab(s) orally 2 times a day , HOLD for BP&lt;110 or Pulse&lt;60  Milk of Magnesia 8% oral suspension: 30 milliliter(s) orally every 3 days as needed for  constipation  pantoprazole 40 mg oral delayed release tablet: 1 tab(s) orally once a day (before a meal)  polyethylene glycol 3350 oral powder for reconstitution: 17 gram(s) orally once a day  ranolazine 500 mg oral tablet, extended release: 1 tab(s) orally 2 times a day  sodium chloride 1 g oral tablet: 1 tab(s) orally once a day

## 2023-08-07 PROBLEM — Z78.9 OTHER SPECIFIED HEALTH STATUS: Chronic | Status: ACTIVE | Noted: 2023-07-27

## 2023-08-10 DIAGNOSIS — Z95.0 PRESENCE OF CARDIAC PACEMAKER: ICD-10-CM

## 2023-08-10 DIAGNOSIS — I50.32 CHRONIC DIASTOLIC (CONGESTIVE) HEART FAILURE: ICD-10-CM

## 2023-08-10 DIAGNOSIS — Z87.891 PERSONAL HISTORY OF NICOTINE DEPENDENCE: ICD-10-CM

## 2023-08-10 DIAGNOSIS — D64.9 ANEMIA, UNSPECIFIED: ICD-10-CM

## 2023-08-10 DIAGNOSIS — K21.9 GASTRO-ESOPHAGEAL REFLUX DISEASE WITHOUT ESOPHAGITIS: ICD-10-CM

## 2023-08-10 DIAGNOSIS — I11.0 HYPERTENSIVE HEART DISEASE WITH HEART FAILURE: ICD-10-CM

## 2023-08-10 DIAGNOSIS — F41.9 ANXIETY DISORDER, UNSPECIFIED: ICD-10-CM

## 2023-08-10 DIAGNOSIS — R53.1 WEAKNESS: ICD-10-CM

## 2023-08-10 DIAGNOSIS — Z79.02 LONG TERM (CURRENT) USE OF ANTITHROMBOTICS/ANTIPLATELETS: ICD-10-CM

## 2023-08-10 DIAGNOSIS — Z91.81 HISTORY OF FALLING: ICD-10-CM

## 2023-08-10 DIAGNOSIS — Z95.2 PRESENCE OF PROSTHETIC HEART VALVE: ICD-10-CM

## 2023-08-10 DIAGNOSIS — E87.1 HYPO-OSMOLALITY AND HYPONATREMIA: ICD-10-CM

## 2023-08-10 DIAGNOSIS — Z87.898 PERSONAL HISTORY OF OTHER SPECIFIED CONDITIONS: ICD-10-CM

## 2023-08-10 DIAGNOSIS — I48.20 CHRONIC ATRIAL FIBRILLATION, UNSPECIFIED: ICD-10-CM

## 2023-08-10 DIAGNOSIS — Z79.82 LONG TERM (CURRENT) USE OF ASPIRIN: ICD-10-CM

## 2023-08-18 ENCOUNTER — APPOINTMENT (OUTPATIENT)
Dept: NEPHROLOGY | Facility: CLINIC | Age: 88
End: 2023-08-18
Payer: MEDICARE

## 2023-08-18 VITALS
WEIGHT: 136 LBS | HEIGHT: 64 IN | TEMPERATURE: 97 F | BODY MASS INDEX: 23.22 KG/M2 | HEART RATE: 74 BPM | DIASTOLIC BLOOD PRESSURE: 58 MMHG | SYSTOLIC BLOOD PRESSURE: 126 MMHG

## 2023-08-18 DIAGNOSIS — E87.1 HYPO-OSMOLALITY AND HYPONATREMIA: ICD-10-CM

## 2023-08-18 DIAGNOSIS — Z95.2 PRESENCE OF PROSTHETIC HEART VALVE: ICD-10-CM

## 2023-08-18 DIAGNOSIS — I10 ESSENTIAL (PRIMARY) HYPERTENSION: ICD-10-CM

## 2023-08-18 DIAGNOSIS — Z95.0 PRESENCE OF CARDIAC PACEMAKER: ICD-10-CM

## 2023-08-18 PROCEDURE — 99215 OFFICE O/P EST HI 40 MIN: CPT

## 2023-08-18 PROCEDURE — 99205 OFFICE O/P NEW HI 60 MIN: CPT

## 2023-08-18 RX ORDER — ESCITALOPRAM OXALATE 10 MG/1
10 TABLET, FILM COATED ORAL
Refills: 0 | Status: ACTIVE | COMMUNITY

## 2023-08-18 RX ORDER — LEVOTHYROXINE SODIUM 0.07 MG/1
75 TABLET ORAL
Refills: 0 | Status: ACTIVE | COMMUNITY

## 2023-08-18 RX ORDER — NORMAL SALT TABLETS 1 G/G
1 TABLET ORAL
Refills: 0 | Status: ACTIVE | COMMUNITY

## 2023-08-18 RX ORDER — GLUCOSAMINE HCL/CHONDROITIN SU 500-400 MG
3 CAPSULE ORAL
Refills: 0 | Status: ACTIVE | COMMUNITY

## 2023-08-18 RX ORDER — APIXABAN 5 MG/1
5 TABLET, FILM COATED ORAL
Refills: 0 | Status: ACTIVE | COMMUNITY

## 2023-08-18 RX ORDER — RANOLAZINE 500 MG/1
500 TABLET, EXTENDED RELEASE ORAL
Refills: 0 | Status: ACTIVE | COMMUNITY

## 2023-08-18 RX ORDER — DIGOXIN 125 UG/1
125 TABLET ORAL
Refills: 0 | Status: ACTIVE | COMMUNITY

## 2023-08-18 RX ORDER — DOCUSATE SODIUM 100 MG/1
100 CAPSULE ORAL
Refills: 0 | Status: ACTIVE | COMMUNITY

## 2023-08-18 RX ORDER — PANTOPRAZOLE SODIUM 40 MG/1
40 GRANULE, DELAYED RELEASE ORAL
Refills: 0 | Status: ACTIVE | COMMUNITY

## 2023-08-18 RX ORDER — METOPROLOL SUCCINATE 25 MG/1
25 TABLET, EXTENDED RELEASE ORAL
Refills: 0 | Status: ACTIVE | COMMUNITY

## 2023-08-18 RX ORDER — LATANOPROST/PF 0.005 %
0.01 DROPS OPHTHALMIC (EYE)
Refills: 0 | Status: ACTIVE | COMMUNITY

## 2023-08-18 RX ORDER — ATORVASTATIN CALCIUM 10 MG/1
10 TABLET, FILM COATED ORAL
Refills: 0 | Status: ACTIVE | COMMUNITY

## 2023-08-18 NOTE — HISTORY OF PRESENT ILLNESS
[FreeTextEntry1] :  Ms Quintero presents for evaluation of Hyponatremia.  admission 7/26-8/2 with acute episode of Hyponatremia. 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 Started on Lasix post TAVR and found to have Hyponatremia. Work up in  c/w depletional hyponatemia. Pt was d/inge on decreased dose of NACL at 1 gm daily and FR 1200cc. Pt has been improving with Physical therapy and feels improved.  Med list from [facility] was reviewed and reconciled.

## 2023-08-18 NOTE — ASSESSMENT
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  --Hyponatremia : Na improved to 136 ( 8/16/2023)   D/c NACL tabs.)   Increase fluid allowance to 1800cc ( had been on 1500cc restriction recently.)  --HTN : Bp control stable.  --Fluid status stable.  --Anemia : post cardiac procedures.  :  follow up once d/c from rehab.

## 2023-08-18 NOTE — PHYSICAL EXAM
[General Appearance - Alert] : alert [General Appearance - In No Acute Distress] : in no acute distress [Sclera] : the sclera and conjunctiva were normal [PERRL With Normal Accommodation] : pupils were equal in size, round, and reactive to light [Outer Ear] : the ears and nose were normal in appearance [Neck Appearance] : the appearance of the neck was normal [] : the neck was supple [Auscultation Breath Sounds / Voice Sounds] : lungs were clear to auscultation bilaterally [Heart Rate And Rhythm] : heart rate was normal and rhythm regular [Heart Sounds] : normal S1 and S2 [FreeTextEntry1] : minimal trace edema [Bowel Sounds] : normal bowel sounds [Abdomen Soft] : soft

## 2023-09-20 ENCOUNTER — NON-APPOINTMENT (OUTPATIENT)
Age: 88
End: 2023-09-20

## 2023-09-20 ENCOUNTER — APPOINTMENT (OUTPATIENT)
Dept: OPHTHALMOLOGY | Facility: CLINIC | Age: 88
End: 2023-09-20
Payer: MEDICARE

## 2023-09-20 PROCEDURE — 92014 COMPRE OPH EXAM EST PT 1/>: CPT

## 2023-10-27 NOTE — PATIENT PROFILE ADULT - FALL HARM RISK - FALLEN IN PAST
[facility] Pregnancy And Lactation Text: This medication is Pregnancy Category X and contraindicated in pregnancy and in women who may become pregnant. It is unknown if this medication is excreted in breast milk. Accidental fall

## 2023-11-22 ENCOUNTER — NON-APPOINTMENT (OUTPATIENT)
Age: 88
End: 2023-11-22

## 2023-11-22 ENCOUNTER — APPOINTMENT (OUTPATIENT)
Dept: OPHTHALMOLOGY | Facility: CLINIC | Age: 88
End: 2023-11-22
Payer: MEDICARE

## 2023-11-22 PROCEDURE — 99213 OFFICE O/P EST LOW 20 MIN: CPT

## 2024-02-14 ENCOUNTER — APPOINTMENT (OUTPATIENT)
Dept: OPHTHALMOLOGY | Facility: CLINIC | Age: 89
End: 2024-02-14

## 2024-02-21 ENCOUNTER — APPOINTMENT (OUTPATIENT)
Dept: OPHTHALMOLOGY | Facility: CLINIC | Age: 89
End: 2024-02-21
Payer: MEDICARE

## 2024-02-21 ENCOUNTER — NON-APPOINTMENT (OUTPATIENT)
Age: 89
End: 2024-02-21

## 2024-02-21 PROCEDURE — 92014 COMPRE OPH EXAM EST PT 1/>: CPT

## 2024-02-21 PROCEDURE — 92134 CPTRZ OPH DX IMG PST SGM RTA: CPT

## 2024-05-13 ENCOUNTER — APPOINTMENT (OUTPATIENT)
Dept: OPHTHALMOLOGY | Facility: CLINIC | Age: 89
End: 2024-05-13

## 2024-05-22 ENCOUNTER — APPOINTMENT (OUTPATIENT)
Dept: OPHTHALMOLOGY | Facility: CLINIC | Age: 89
End: 2024-05-22

## 2024-09-23 ENCOUNTER — NON-APPOINTMENT (OUTPATIENT)
Age: 89
End: 2024-09-23

## 2024-09-23 ENCOUNTER — APPOINTMENT (OUTPATIENT)
Dept: OPHTHALMOLOGY | Facility: CLINIC | Age: 89
End: 2024-09-23
Payer: MEDICARE

## 2024-09-23 PROCEDURE — 92133 CPTRZD OPH DX IMG PST SGM ON: CPT

## 2024-09-23 PROCEDURE — 92083 EXTENDED VISUAL FIELD XM: CPT

## 2024-10-02 ENCOUNTER — NON-APPOINTMENT (OUTPATIENT)
Age: 89
End: 2024-10-02

## 2024-10-02 ENCOUNTER — APPOINTMENT (OUTPATIENT)
Dept: OPHTHALMOLOGY | Facility: CLINIC | Age: 89
End: 2024-10-02
Payer: MEDICARE

## 2024-10-02 PROCEDURE — 99213 OFFICE O/P EST LOW 20 MIN: CPT

## 2025-04-07 ENCOUNTER — APPOINTMENT (OUTPATIENT)
Dept: OPHTHALMOLOGY | Facility: CLINIC | Age: 89
End: 2025-04-07
Payer: MEDICARE

## 2025-04-07 PROCEDURE — 92083 EXTENDED VISUAL FIELD XM: CPT

## 2025-04-07 PROCEDURE — 92133 CPTRZD OPH DX IMG PST SGM ON: CPT

## 2025-04-30 ENCOUNTER — APPOINTMENT (OUTPATIENT)
Dept: OPHTHALMOLOGY | Facility: CLINIC | Age: 89
End: 2025-04-30
Payer: MEDICARE

## 2025-04-30 ENCOUNTER — NON-APPOINTMENT (OUTPATIENT)
Age: 89
End: 2025-04-30

## 2025-04-30 PROCEDURE — 92134 CPTRZ OPH DX IMG PST SGM RTA: CPT

## 2025-04-30 PROCEDURE — 92014 COMPRE OPH EXAM EST PT 1/>: CPT

## 2025-07-23 ENCOUNTER — APPOINTMENT (OUTPATIENT)
Dept: OPHTHALMOLOGY | Facility: CLINIC | Age: 89
End: 2025-07-23
Payer: MEDICARE

## 2025-07-23 ENCOUNTER — NON-APPOINTMENT (OUTPATIENT)
Age: 89
End: 2025-07-23

## 2025-07-23 PROCEDURE — 99213 OFFICE O/P EST LOW 20 MIN: CPT
